# Patient Record
Sex: FEMALE | Race: OTHER | HISPANIC OR LATINO | URBAN - METROPOLITAN AREA
[De-identification: names, ages, dates, MRNs, and addresses within clinical notes are randomized per-mention and may not be internally consistent; named-entity substitution may affect disease eponyms.]

---

## 2023-10-02 ENCOUNTER — TELEPHONE ENCOUNTER (OUTPATIENT)
Dept: URBAN - METROPOLITAN AREA CLINIC 82 | Facility: CLINIC | Age: 48
End: 2023-10-02

## 2023-10-02 ENCOUNTER — OFFICE VISIT (OUTPATIENT)
Dept: URBAN - METROPOLITAN AREA CLINIC 82 | Facility: CLINIC | Age: 48
End: 2023-10-02
Payer: COMMERCIAL

## 2023-10-02 ENCOUNTER — LAB OUTSIDE AN ENCOUNTER (OUTPATIENT)
Dept: URBAN - METROPOLITAN AREA CLINIC 82 | Facility: CLINIC | Age: 48
End: 2023-10-02

## 2023-10-02 VITALS
BODY MASS INDEX: 29.67 KG/M2 | HEART RATE: 81 BPM | DIASTOLIC BLOOD PRESSURE: 87 MMHG | TEMPERATURE: 96.8 F | HEIGHT: 66 IN | WEIGHT: 184.6 LBS | SYSTOLIC BLOOD PRESSURE: 131 MMHG

## 2023-10-02 DIAGNOSIS — R10.33 PERIUMBILICAL PAIN: ICD-10-CM

## 2023-10-02 DIAGNOSIS — K21.9 ACID REFLUX: ICD-10-CM

## 2023-10-02 PROBLEM — 82423001: Status: ACTIVE | Noted: 2023-10-02

## 2023-10-02 PROCEDURE — 99244 OFF/OP CNSLTJ NEW/EST MOD 40: CPT | Performed by: STUDENT IN AN ORGANIZED HEALTH CARE EDUCATION/TRAINING PROGRAM

## 2023-10-02 PROCEDURE — 99204 OFFICE O/P NEW MOD 45 MIN: CPT | Performed by: STUDENT IN AN ORGANIZED HEALTH CARE EDUCATION/TRAINING PROGRAM

## 2023-10-02 RX ORDER — FERROUS SULFATE TAB 325 MG (65 MG ELEMENTAL FE) 325 (65 FE) MG
1 TABLET TAB ORAL
Status: ACTIVE | COMMUNITY

## 2023-10-02 RX ORDER — DICLOFENAC SODIUM TOPICAL GEL, 1% 10 MG/G
AS DIRECTED GEL TOPICAL
Status: ACTIVE | COMMUNITY

## 2023-10-02 RX ORDER — CLINDAMYCIN HYDROCHLORIDE 300 MG/1
1 CAPSULE CAPSULE ORAL
Status: ACTIVE | COMMUNITY

## 2023-10-02 RX ORDER — ATORVASTATIN CALCIUM 80 MG/1
1 TABLET TABLET, FILM COATED ORAL ONCE A DAY
Status: ACTIVE | COMMUNITY

## 2023-10-02 RX ORDER — CETIRIZINE HYDROCHLORIDE 10 MG/1
1 TABLET TABLET, FILM COATED ORAL ONCE A DAY
Status: ACTIVE | COMMUNITY

## 2023-10-02 RX ORDER — NYSTATIN 100000 [USP'U]/G
1 APPLICATION CREAM TOPICAL TWICE A DAY
Status: ACTIVE | COMMUNITY

## 2023-10-02 RX ORDER — CARVEDILOL 12.5 MG/1
1 TABLET WITH FOOD TABLET, FILM COATED ORAL TWICE A DAY
Status: ACTIVE | COMMUNITY

## 2023-10-02 RX ORDER — FEXOFENADINE HYDROCHLORIDE 180 MG/1
1 TABLET SWALLOW WHOLE WITH WATER; DO NOT TAKE WITH FRUIT JUICES TABLET ORAL ONCE A DAY
Status: ACTIVE | COMMUNITY

## 2023-10-02 RX ORDER — KETOCONAZOLE 20 MG/ML
AS DIRECTED SHAMPOO, SUSPENSION TOPICAL
Status: ACTIVE | COMMUNITY

## 2023-10-02 RX ORDER — HYDROXYZINE HYDROCHLORIDE 25 MG/1
1 TABLET AS NEEDED TABLET, FILM COATED ORAL ONCE A DAY
Status: ACTIVE | COMMUNITY

## 2023-10-02 RX ORDER — HYOSCYAMINE SULFATE 0.12 MG/1
1 TABLET AS NEEDED TABLET ORAL
Qty: 180 | Refills: 0 | OUTPATIENT
Start: 2023-10-02 | End: 2023-11-01

## 2023-10-02 RX ORDER — MONTELUKAST 10 MG/1
1 TABLET TABLET, FILM COATED ORAL ONCE A DAY
Status: ACTIVE | COMMUNITY

## 2023-10-02 RX ORDER — PANTOPRAZOLE SODIUM 40 MG/1
1 TABLET TABLET, DELAYED RELEASE ORAL ONCE A DAY
Status: ACTIVE | COMMUNITY

## 2023-10-02 RX ORDER — GABAPENTIN 300 MG/1
1 CAPSULE CAPSULE ORAL
Status: ACTIVE | COMMUNITY

## 2023-10-02 RX ORDER — EPINEPHRINE 0.3 MG/.3ML
AS DIRECTED INJECTION INTRAMUSCULAR; SUBCUTANEOUS
Status: ACTIVE | COMMUNITY

## 2023-10-02 RX ORDER — NAPROXEN 500 MG/1
1 TABLET WITH FOOD OR MILK AS NEEDED TABLET ORAL
Status: ACTIVE | COMMUNITY

## 2023-10-02 RX ORDER — FLUTICASONE PROPIONATE 50 UG/1
1 SPRAY IN EACH NOSTRIL SPRAY, METERED NASAL ONCE A DAY
Status: ACTIVE | COMMUNITY

## 2023-10-02 RX ORDER — HYDROCORTISONE 25 MG/G
1 APPLICATION CREAM TOPICAL ONCE A DAY
Status: ACTIVE | COMMUNITY

## 2023-10-02 RX ORDER — FAMOTIDINE 40 MG/1
1 TABLET AT BEDTIME TABLET, FILM COATED ORAL ONCE A DAY
Status: ACTIVE | COMMUNITY

## 2023-10-02 RX ORDER — NIFEDIPINE 60 MG/1
1 TABLET ON AN EMPTY STOMACH TABLET, EXTENDED RELEASE ORAL ONCE A DAY
Status: ACTIVE | COMMUNITY

## 2023-10-02 RX ORDER — MAGNESIUM OXIDE 400 MG/1
1 TABLET AS NEEDED TABLET ORAL ONCE A DAY
Status: ACTIVE | COMMUNITY

## 2023-10-02 NOTE — HPI-TODAY'S VISIT:
48 y.o femake w PMH of IBS, anxiety, depression, HTN, fibromyalgia, chronic abd pain was referred by Lizet Honeycutt at [facility] for abd pain. A copy of today's note will be sent to the referring clinic.   At today visit, patient admits periumbilical pain, right > left on going for a while, worsening for the past 2 weeks. Admits random onset, denies any provocative factors. Mild relief w BM. She also admits of acid reflux, n/v,recently started. ENT rx patient w pantoprazole and famotidine, mild relief as well. BM: once in the morning, takes magesium daily. No melena or hematochezia. She tries to avoid acidic food and gluten products as well, unsure if it truly helps w pain or not. CT A/P on 09/25/23 grossly unremarkable.   Previously seen by Dr. Saji Adair, colonoscopy this year was normal aside from diverticulosis. She denies any unintentional wt loss, nocturnal sx. No fhx of CRC or gastric cancer. She was previous seen at [facility] in the past for abd pain. EGD/COLONOSCOPY 2013 unremarkable. Had her gallbladder out during that time. Other abd surgeries include abdominoplasty, partial hysterectomy.

## 2023-10-04 ENCOUNTER — CLAIMS CREATED FROM THE CLAIM WINDOW (OUTPATIENT)
Dept: URBAN - METROPOLITAN AREA CLINIC 4 | Facility: CLINIC | Age: 48
End: 2023-10-04
Payer: COMMERCIAL

## 2023-10-04 ENCOUNTER — OFFICE VISIT (OUTPATIENT)
Dept: URBAN - METROPOLITAN AREA SURGERY CENTER 13 | Facility: SURGERY CENTER | Age: 48
End: 2023-10-04
Payer: COMMERCIAL

## 2023-10-04 DIAGNOSIS — K31.9 ERYTHEMA OF GASTRIC ANTRUM: ICD-10-CM

## 2023-10-04 DIAGNOSIS — K31.89 REACTIVE GASTROPATHY: ICD-10-CM

## 2023-10-04 DIAGNOSIS — K22.10 ESOPHAGEAL ULCER: ICD-10-CM

## 2023-10-04 DIAGNOSIS — K21.9 GASTRO-ESOPHAGEAL REFLUX DISEASE WITHOUT ESOPHAGITIS: ICD-10-CM

## 2023-10-04 DIAGNOSIS — R10.33 ABDOMINAL PAIN, ACUTE, PERIUMBILICAL: ICD-10-CM

## 2023-10-04 DIAGNOSIS — K31.89 OTHER DISEASES OF STOMACH AND DUODENUM: ICD-10-CM

## 2023-10-04 DIAGNOSIS — R10.13 INDIGESTION: ICD-10-CM

## 2023-10-04 DIAGNOSIS — K29.70 GASTRITIS, UNSPECIFIED, WITHOUT BLEEDING: ICD-10-CM

## 2023-10-04 PROCEDURE — G8907 PT DOC NO EVENTS ON DISCHARG: HCPCS | Performed by: INTERNAL MEDICINE

## 2023-10-04 PROCEDURE — 43239 EGD BIOPSY SINGLE/MULTIPLE: CPT | Performed by: INTERNAL MEDICINE

## 2023-10-04 PROCEDURE — 88312 SPECIAL STAINS GROUP 1: CPT | Performed by: PATHOLOGY

## 2023-10-04 PROCEDURE — 00731 ANES UPR GI NDSC PX NOS: CPT | Performed by: ANESTHESIOLOGY

## 2023-10-04 PROCEDURE — 88305 TISSUE EXAM BY PATHOLOGIST: CPT | Performed by: PATHOLOGY

## 2023-10-04 PROCEDURE — 00731 ANES UPR GI NDSC PX NOS: CPT | Performed by: NURSE ANESTHETIST, CERTIFIED REGISTERED

## 2023-10-04 RX ORDER — FLUTICASONE PROPIONATE 50 UG/1
1 SPRAY IN EACH NOSTRIL SPRAY, METERED NASAL ONCE A DAY
Status: ACTIVE | COMMUNITY

## 2023-10-04 RX ORDER — PANTOPRAZOLE SODIUM 40 MG/1
1 TABLET TABLET, DELAYED RELEASE ORAL ONCE A DAY
Status: ACTIVE | COMMUNITY

## 2023-10-04 RX ORDER — FAMOTIDINE 40 MG/1
1 TABLET AT BEDTIME TABLET, FILM COATED ORAL ONCE A DAY
Status: ACTIVE | COMMUNITY

## 2023-10-04 RX ORDER — ATORVASTATIN CALCIUM 80 MG/1
1 TABLET TABLET, FILM COATED ORAL ONCE A DAY
Status: ACTIVE | COMMUNITY

## 2023-10-04 RX ORDER — HYDROXYZINE HYDROCHLORIDE 25 MG/1
1 TABLET AS NEEDED TABLET, FILM COATED ORAL ONCE A DAY
Status: ACTIVE | COMMUNITY

## 2023-10-04 RX ORDER — CLINDAMYCIN HYDROCHLORIDE 300 MG/1
1 CAPSULE CAPSULE ORAL
Status: ACTIVE | COMMUNITY

## 2023-10-04 RX ORDER — CETIRIZINE HYDROCHLORIDE 10 MG/1
1 TABLET TABLET, FILM COATED ORAL ONCE A DAY
Status: ACTIVE | COMMUNITY

## 2023-10-04 RX ORDER — HYOSCYAMINE SULFATE 0.12 MG/1
1 TABLET AS NEEDED TABLET ORAL
Qty: 180 | Refills: 0 | Status: ACTIVE | COMMUNITY
Start: 2023-10-02 | End: 2023-11-01

## 2023-10-04 RX ORDER — NYSTATIN 100000 [USP'U]/G
1 APPLICATION CREAM TOPICAL TWICE A DAY
Status: ACTIVE | COMMUNITY

## 2023-10-04 RX ORDER — KETOCONAZOLE 20 MG/ML
AS DIRECTED SHAMPOO, SUSPENSION TOPICAL
Status: ACTIVE | COMMUNITY

## 2023-10-04 RX ORDER — NIFEDIPINE 60 MG/1
1 TABLET ON AN EMPTY STOMACH TABLET, EXTENDED RELEASE ORAL ONCE A DAY
Status: ACTIVE | COMMUNITY

## 2023-10-04 RX ORDER — FEXOFENADINE HYDROCHLORIDE 180 MG/1
1 TABLET SWALLOW WHOLE WITH WATER; DO NOT TAKE WITH FRUIT JUICES TABLET ORAL ONCE A DAY
Status: ACTIVE | COMMUNITY

## 2023-10-04 RX ORDER — FERROUS SULFATE TAB 325 MG (65 MG ELEMENTAL FE) 325 (65 FE) MG
1 TABLET TAB ORAL
Status: ACTIVE | COMMUNITY

## 2023-10-04 RX ORDER — NAPROXEN 500 MG/1
1 TABLET WITH FOOD OR MILK AS NEEDED TABLET ORAL
Status: ACTIVE | COMMUNITY

## 2023-10-04 RX ORDER — MAGNESIUM OXIDE 400 MG/1
1 TABLET AS NEEDED TABLET ORAL ONCE A DAY
Status: ACTIVE | COMMUNITY

## 2023-10-04 RX ORDER — DICLOFENAC SODIUM TOPICAL GEL, 1% 10 MG/G
AS DIRECTED GEL TOPICAL
Status: ACTIVE | COMMUNITY

## 2023-10-04 RX ORDER — CARVEDILOL 12.5 MG/1
1 TABLET WITH FOOD TABLET, FILM COATED ORAL TWICE A DAY
Status: ACTIVE | COMMUNITY

## 2023-10-04 RX ORDER — EPINEPHRINE 0.3 MG/.3ML
AS DIRECTED INJECTION INTRAMUSCULAR; SUBCUTANEOUS
Status: ACTIVE | COMMUNITY

## 2023-10-04 RX ORDER — HYDROCORTISONE 25 MG/G
1 APPLICATION CREAM TOPICAL ONCE A DAY
Status: ACTIVE | COMMUNITY

## 2023-10-04 RX ORDER — GABAPENTIN 300 MG/1
1 CAPSULE CAPSULE ORAL
Status: ACTIVE | COMMUNITY

## 2023-10-04 RX ORDER — MONTELUKAST 10 MG/1
1 TABLET TABLET, FILM COATED ORAL ONCE A DAY
Status: ACTIVE | COMMUNITY

## 2023-11-06 ENCOUNTER — OFFICE VISIT (OUTPATIENT)
Dept: URBAN - METROPOLITAN AREA CLINIC 82 | Facility: CLINIC | Age: 48
End: 2023-11-06
Payer: COMMERCIAL

## 2023-11-06 VITALS
WEIGHT: 185.4 LBS | HEART RATE: 96 BPM | SYSTOLIC BLOOD PRESSURE: 125 MMHG | HEIGHT: 66 IN | TEMPERATURE: 97.5 F | DIASTOLIC BLOOD PRESSURE: 89 MMHG | BODY MASS INDEX: 29.8 KG/M2

## 2023-11-06 DIAGNOSIS — L29.0 ANAL PRURITUS: ICD-10-CM

## 2023-11-06 DIAGNOSIS — R10.33 PERIUMBILICAL PAIN: ICD-10-CM

## 2023-11-06 DIAGNOSIS — K22.10 ULCER OF ESOPHAGUS WITHOUT BLEEDING: ICD-10-CM

## 2023-11-06 DIAGNOSIS — K59.09 CHRONIC CONSTIPATION: ICD-10-CM

## 2023-11-06 PROBLEM — 90446007: Status: ACTIVE | Noted: 2023-11-06

## 2023-11-06 PROBLEM — 162046002: Status: ACTIVE | Noted: 2023-11-06

## 2023-11-06 PROBLEM — 236069009: Status: ACTIVE | Noted: 2023-11-06

## 2023-11-06 PROBLEM — 30811009: Status: ACTIVE | Noted: 2023-11-06

## 2023-11-06 PROCEDURE — 99214 OFFICE O/P EST MOD 30 MIN: CPT | Performed by: STUDENT IN AN ORGANIZED HEALTH CARE EDUCATION/TRAINING PROGRAM

## 2023-11-06 RX ORDER — NYSTATIN 100000 [USP'U]/G
1 APPLICATION CREAM TOPICAL TWICE A DAY
Status: ACTIVE | COMMUNITY

## 2023-11-06 RX ORDER — HYDROXYZINE HYDROCHLORIDE 25 MG/1
1 TABLET AS NEEDED TABLET, FILM COATED ORAL ONCE A DAY
Status: ACTIVE | COMMUNITY

## 2023-11-06 RX ORDER — CARVEDILOL 12.5 MG/1
1 TABLET WITH FOOD TABLET, FILM COATED ORAL TWICE A DAY
Status: ACTIVE | COMMUNITY

## 2023-11-06 RX ORDER — FEXOFENADINE HYDROCHLORIDE 180 MG/1
1 TABLET SWALLOW WHOLE WITH WATER; DO NOT TAKE WITH FRUIT JUICES TABLET ORAL ONCE A DAY
Status: ACTIVE | COMMUNITY

## 2023-11-06 RX ORDER — EPINEPHRINE 0.3 MG/.3ML
AS DIRECTED INJECTION INTRAMUSCULAR; SUBCUTANEOUS
Status: ACTIVE | COMMUNITY

## 2023-11-06 RX ORDER — GABAPENTIN 300 MG/1
1 CAPSULE CAPSULE ORAL
Status: ACTIVE | COMMUNITY

## 2023-11-06 RX ORDER — KETOCONAZOLE 20 MG/ML
AS DIRECTED SHAMPOO, SUSPENSION TOPICAL
Status: ACTIVE | COMMUNITY

## 2023-11-06 RX ORDER — HYOSCYAMINE SULFATE 0.125 MG
1 TABLET ON THE TONGUE AND ALLOW TO DISSOLVE AS NEEDED TABLET,DISINTEGRATING ORAL
Qty: 180 | Refills: 0
End: 2024-02-04

## 2023-11-06 RX ORDER — CLINDAMYCIN HYDROCHLORIDE 300 MG/1
1 CAPSULE CAPSULE ORAL
Status: ACTIVE | COMMUNITY

## 2023-11-06 RX ORDER — SUCRALFATE 1 G/1
1 TABLET ON AN EMPTY STOMACH TABLET ORAL TWICE A DAY
Qty: 60 | OUTPATIENT
Start: 2023-11-06 | End: 2023-12-06

## 2023-11-06 RX ORDER — MAGNESIUM OXIDE 400 MG/1
1 TABLET AS NEEDED TABLET ORAL ONCE A DAY
Status: ACTIVE | COMMUNITY

## 2023-11-06 RX ORDER — HYDROCORTISONE 25 MG/G
1 APPLICATION CREAM TOPICAL ONCE A DAY
Status: ACTIVE | COMMUNITY

## 2023-11-06 RX ORDER — CETIRIZINE HYDROCHLORIDE 10 MG/1
1 TABLET TABLET, FILM COATED ORAL ONCE A DAY
Status: ACTIVE | COMMUNITY

## 2023-11-06 RX ORDER — ATORVASTATIN CALCIUM 80 MG/1
1 TABLET TABLET, FILM COATED ORAL ONCE A DAY
Status: ACTIVE | COMMUNITY

## 2023-11-06 RX ORDER — NITROFURANTOIN (MONOHYDRATE/MACROCRYSTALS) 75; 25 MG/1; MG/1
1 CAPSULE WITH FOOD CAPSULE ORAL
Status: ACTIVE | COMMUNITY

## 2023-11-06 RX ORDER — HYOSCYAMINE SULFATE 0.125 MG
1 TABLET ON THE TONGUE AND ALLOW TO DISSOLVE  AS NEEDED TABLET,DISINTEGRATING ORAL
Status: ACTIVE | COMMUNITY

## 2023-11-06 RX ORDER — NAPROXEN 500 MG/1
1 TABLET WITH FOOD OR MILK AS NEEDED TABLET ORAL
Status: ACTIVE | COMMUNITY

## 2023-11-06 RX ORDER — LINACLOTIDE 145 UG/1
1 CAPSULE AT LEAST 30 MINUTES BEFORE THE FIRST MEAL OF THE DAY ON AN EMPTY STOMACH CAPSULE, GELATIN COATED ORAL ONCE A DAY
Qty: 30 | OUTPATIENT
Start: 2023-11-06 | End: 2023-12-06

## 2023-11-06 RX ORDER — DICYCLOMINE HYDROCHLORIDE 10 MG/1
1 TABLET CAPSULE ORAL
Qty: 90 | Refills: 0 | OUTPATIENT
Start: 2023-11-06 | End: 2023-12-06

## 2023-11-06 RX ORDER — FERROUS SULFATE TAB 325 MG (65 MG ELEMENTAL FE) 325 (65 FE) MG
1 TABLET TAB ORAL
Status: ACTIVE | COMMUNITY

## 2023-11-06 RX ORDER — FAMOTIDINE 40 MG/1
1 TABLET AT BEDTIME TABLET, FILM COATED ORAL ONCE A DAY
Status: ACTIVE | COMMUNITY

## 2023-11-06 RX ORDER — PANTOPRAZOLE SODIUM 40 MG/1
1 TABLET TABLET, DELAYED RELEASE ORAL ONCE A DAY
Status: ACTIVE | COMMUNITY

## 2023-11-06 RX ORDER — NIFEDIPINE 60 MG/1
1 TABLET ON AN EMPTY STOMACH TABLET, EXTENDED RELEASE ORAL ONCE A DAY
Status: ACTIVE | COMMUNITY

## 2023-11-06 RX ORDER — MONTELUKAST 10 MG/1
1 TABLET TABLET, FILM COATED ORAL ONCE A DAY
Status: ACTIVE | COMMUNITY

## 2023-11-06 RX ORDER — DICLOFENAC SODIUM TOPICAL GEL, 1% 10 MG/G
AS DIRECTED GEL TOPICAL
Status: ACTIVE | COMMUNITY

## 2023-11-06 RX ORDER — FLUTICASONE PROPIONATE 50 UG/1
1 SPRAY IN EACH NOSTRIL SPRAY, METERED NASAL ONCE A DAY
Status: ACTIVE | COMMUNITY

## 2023-11-06 NOTE — HPI-TODAY'S VISIT:
48 y.o femake w PMH of IBS, anxiety, depression, HTN, fibromyalgia, chronic abd pain presents today for F/U on EGD.   Recap of last visit: patient admits periumbilical pain, right > left on going for a while, worsening for the past 2 weeks. Admits random onset, denies any provocative factors. Mild relief w BM. She also admits of acid reflux, n/v,recently started. ENT rx patient w pantoprazole and famotidine, mild relief as well. BM: once in the morning, takes magesium daily. No melena or hematochezia. She tries to avoid acidic food and gluten products as well, unsure if it truly helps w pain or not. CT A/P on 09/25/23 grossly unremarkable.   Previously seen by Dr. Saji Adair, multiple colonoscopy in the past: 2015, 2020, 2023, informed to repeat in 5-10yrs. She denies any unintentional wt loss, nocturnal sx. No fhx of CRC or gastric cancer. She was previous seen at [facility] in the past for abd pain. EGD/COLONOSCOPY 2013 unremarkable. Had her gallbladder out during that time. Other abd surgeries include abdominoplasty, partial hysterectomy.   EGD on 10/2023 w Dr. Fulton revealied: Normal hypopharynx. Esophageal ulcer with no bleeding and no stigmata of recent bleeding. Acute gastritis. Bx result grossly unremarkable.  At today visit, patient reports dealing w/ anal pruritus that started 1 week ago that radiates to her her vaginal area. Went to UC, was found to have UTI. Patient states that she is constipated. She took linzess in the past, 145mcg which helped really well. Doesnt have any medications for this anymore. She still on omeprazole 40mg daily which does provides some relief to her heartburns. Hyoscyamine does help w/ abd pain. Overall sx still there, hasnt worsen.

## 2023-12-22 ENCOUNTER — CLAIMS CREATED FROM THE CLAIM WINDOW (OUTPATIENT)
Dept: URBAN - METROPOLITAN AREA CLINIC 82 | Facility: CLINIC | Age: 48
End: 2023-12-22
Payer: COMMERCIAL

## 2023-12-22 VITALS
WEIGHT: 181.4 LBS | HEIGHT: 66 IN | TEMPERATURE: 94.8 F | BODY MASS INDEX: 29.15 KG/M2 | HEART RATE: 85 BPM | SYSTOLIC BLOOD PRESSURE: 144 MMHG | DIASTOLIC BLOOD PRESSURE: 99 MMHG

## 2023-12-22 DIAGNOSIS — K58.1 IRRITABLE BOWEL SYNDROME WITH CONSTIPATION: ICD-10-CM

## 2023-12-22 DIAGNOSIS — K22.10 ULCER OF ESOPHAGUS WITHOUT BLEEDING: ICD-10-CM

## 2023-12-22 DIAGNOSIS — R10.33 PERIUMBILICAL PAIN: ICD-10-CM

## 2023-12-22 DIAGNOSIS — L29.0 ANAL PRURITUS: ICD-10-CM

## 2023-12-22 PROBLEM — 440630006: Status: ACTIVE | Noted: 2023-12-22

## 2023-12-22 PROCEDURE — 99214 OFFICE O/P EST MOD 30 MIN: CPT | Performed by: STUDENT IN AN ORGANIZED HEALTH CARE EDUCATION/TRAINING PROGRAM

## 2023-12-22 RX ORDER — HYOSCYAMINE SULFATE 0.125 MG
1 TABLET ON THE TONGUE AND ALLOW TO DISSOLVE AS NEEDED TABLET,DISINTEGRATING ORAL
Qty: 180 | Refills: 0 | Status: ACTIVE | COMMUNITY
End: 2024-02-04

## 2023-12-22 RX ORDER — KETOCONAZOLE 20 MG/ML
AS DIRECTED SHAMPOO, SUSPENSION TOPICAL
Status: ACTIVE | COMMUNITY

## 2023-12-22 RX ORDER — LINACLOTIDE 290 UG/1
1 CAPSULE AT LEAST 30 MINUTES BEFORE THE FIRST MEAL OF THE DAY ON AN EMPTY STOMACH CAPSULE, GELATIN COATED ORAL ONCE A DAY
Qty: 90 | Refills: 3 | OUTPATIENT
Start: 2023-12-22 | End: 2024-12-16

## 2023-12-22 RX ORDER — HYOSCYAMINE SULFATE 0.125 MG
1 TABLET ON THE TONGUE AND ALLOW TO DISSOLVE AS NEEDED TABLET,DISINTEGRATING ORAL
Qty: 180 | Refills: 0

## 2023-12-22 RX ORDER — ATORVASTATIN CALCIUM 80 MG/1
1 TABLET TABLET, FILM COATED ORAL ONCE A DAY
Status: ACTIVE | COMMUNITY

## 2023-12-22 RX ORDER — CARVEDILOL 12.5 MG/1
1 TABLET WITH FOOD TABLET, FILM COATED ORAL TWICE A DAY
Status: ACTIVE | COMMUNITY

## 2023-12-22 RX ORDER — FLUTICASONE PROPIONATE 50 UG/1
1 SPRAY IN EACH NOSTRIL SPRAY, METERED NASAL ONCE A DAY
Status: ACTIVE | COMMUNITY

## 2023-12-22 RX ORDER — FERROUS SULFATE TAB 325 MG (65 MG ELEMENTAL FE) 325 (65 FE) MG
1 TABLET TAB ORAL
Status: ACTIVE | COMMUNITY

## 2023-12-22 RX ORDER — HYDROCORTISONE 25 MG/G
1 APPLICATION CREAM TOPICAL ONCE A DAY
Status: ACTIVE | COMMUNITY

## 2023-12-22 RX ORDER — EPINEPHRINE 0.3 MG/.3ML
AS DIRECTED INJECTION INTRAMUSCULAR; SUBCUTANEOUS
Status: ACTIVE | COMMUNITY

## 2023-12-22 RX ORDER — NIFEDIPINE 60 MG/1
1 TABLET ON AN EMPTY STOMACH TABLET, EXTENDED RELEASE ORAL ONCE A DAY
Status: ACTIVE | COMMUNITY

## 2023-12-22 RX ORDER — HYDROXYZINE HYDROCHLORIDE 25 MG/1
1 TABLET AS NEEDED TABLET, FILM COATED ORAL ONCE A DAY
Status: ACTIVE | COMMUNITY

## 2023-12-22 RX ORDER — MAGNESIUM OXIDE 400 MG/1
1 TABLET AS NEEDED TABLET ORAL ONCE A DAY
Status: ACTIVE | COMMUNITY

## 2023-12-22 RX ORDER — DICLOFENAC SODIUM TOPICAL GEL, 1% 10 MG/G
AS DIRECTED GEL TOPICAL
Status: ACTIVE | COMMUNITY

## 2023-12-22 RX ORDER — GABAPENTIN 300 MG/1
1 CAPSULE CAPSULE ORAL
Status: ACTIVE | COMMUNITY

## 2023-12-22 RX ORDER — FAMOTIDINE 40 MG/1
1 TABLET AT BEDTIME TABLET, FILM COATED ORAL ONCE A DAY
Status: ACTIVE | COMMUNITY

## 2023-12-22 RX ORDER — NITROFURANTOIN (MONOHYDRATE/MACROCRYSTALS) 75; 25 MG/1; MG/1
1 CAPSULE WITH FOOD CAPSULE ORAL
Status: ACTIVE | COMMUNITY

## 2023-12-22 RX ORDER — PANTOPRAZOLE SODIUM 40 MG/1
1 TABLET TABLET, DELAYED RELEASE ORAL
Qty: 90 | Refills: 2 | OUTPATIENT
Start: 2023-12-22

## 2023-12-22 RX ORDER — NYSTATIN 100000 [USP'U]/G
1 APPLICATION CREAM TOPICAL TWICE A DAY
Status: ACTIVE | COMMUNITY

## 2023-12-22 RX ORDER — CETIRIZINE HYDROCHLORIDE 10 MG/1
1 TABLET TABLET, FILM COATED ORAL ONCE A DAY
Status: ACTIVE | COMMUNITY

## 2023-12-22 RX ORDER — PANTOPRAZOLE SODIUM 40 MG/1
1 TABLET TABLET, DELAYED RELEASE ORAL ONCE A DAY
Status: ACTIVE | COMMUNITY

## 2023-12-22 RX ORDER — FEXOFENADINE HYDROCHLORIDE 180 MG/1
1 TABLET SWALLOW WHOLE WITH WATER; DO NOT TAKE WITH FRUIT JUICES TABLET ORAL ONCE A DAY
Status: ACTIVE | COMMUNITY

## 2023-12-22 RX ORDER — MONTELUKAST 10 MG/1
1 TABLET TABLET, FILM COATED ORAL ONCE A DAY
Status: ACTIVE | COMMUNITY

## 2023-12-22 RX ORDER — CLINDAMYCIN HYDROCHLORIDE 300 MG/1
1 CAPSULE CAPSULE ORAL
Status: ACTIVE | COMMUNITY

## 2023-12-22 RX ORDER — NAPROXEN 500 MG/1
1 TABLET WITH FOOD OR MILK AS NEEDED TABLET ORAL
Status: ACTIVE | COMMUNITY

## 2023-12-22 NOTE — HPI-TODAY'S VISIT:
48 y.o femake w PMH of IBS, anxiety, depression, HTN, fibromyalgia, chronic abd pain presents today for F/U.  Recap of previous visits: patient admits periumbilical pain, right > left on going for a while, worsening for the past 2 weeks. Admits random onset, denies any provocative factors. Mild relief w BM. She also admits of acid reflux, n/v,recently started. ENT rx patient w pantoprazole and famotidine, mild relief as well. BM: once in the morning, takes magesium daily. No melena or hematochezia. She tries to avoid acidic food and gluten products as well, unsure if it truly helps w pain or not. CT A/P on 09/25/23 grossly unremarkable. Previously seen by Dr. Saji Adair, multiple colonoscopy in the past: 2015, 2020, 2023, informed to repeat in 5-10yrs. . She was previous seen at [facility] in the past for abd pain. Had her gallbladder out during that time. Other abd surgeries include abdominoplasty, partial hysterectomy.   EGD on 10/2023 w Dr. Fulton revealied: Normal hypopharynx. Esophageal ulcer with no bleeding and no stigmata of recent bleeding. Acute gastritis. Bx result grossly unremarkable.  At last visit, patient reports dealing w/ anal pruritus that started 1 week ago that radiates to her her vaginal area. Went to UC, was found to have UTI. Patient states that she is constipated. She took linzess in the past, 145mcg which helped really well. Doesnt have any medications for this anymore. She still on omeprazole 40mg daily which does provides some relief to her heartburns. Hyoscyamine does help w/ abd pain. Overall sx still there, hasnt worsen.  At this visit, patient states that she is still having lots of phelgm, heartburns, globus sensation in her throat. Patient took carafate, no relief. She is not on PPI, unsure why. Didnt know she needs it. Still having occasional bloating. Linzess 145mcg doesnt seem to work anymore. Needs to take it miralax w it. No melena or hematochezia. Abd pain overall controlled w/ Hyoscyamine.

## 2024-01-19 ENCOUNTER — TELEPHONE ENCOUNTER (OUTPATIENT)
Dept: URBAN - METROPOLITAN AREA CLINIC 82 | Facility: CLINIC | Age: 49
End: 2024-01-19

## 2024-01-19 RX ORDER — ONDANSETRON 4 MG/1
1 TABLET ON THE TONGUE AND ALLOW TO DISSOLVE TABLET, ORALLY DISINTEGRATING ORAL
Qty: 15 TABLET | Refills: 0 | OUTPATIENT
Start: 2024-01-19

## 2024-01-31 ENCOUNTER — OFFICE VISIT (OUTPATIENT)
Dept: URBAN - METROPOLITAN AREA CLINIC 82 | Facility: CLINIC | Age: 49
End: 2024-01-31
Payer: COMMERCIAL

## 2024-01-31 VITALS
BODY MASS INDEX: 29.8 KG/M2 | DIASTOLIC BLOOD PRESSURE: 79 MMHG | HEART RATE: 99 BPM | WEIGHT: 185.4 LBS | SYSTOLIC BLOOD PRESSURE: 117 MMHG | TEMPERATURE: 98 F | HEIGHT: 66 IN

## 2024-01-31 DIAGNOSIS — K58.1 IRRITABLE BOWEL SYNDROME WITH CONSTIPATION: ICD-10-CM

## 2024-01-31 DIAGNOSIS — K63.8219 SMALL INTESTINAL BACTERIAL OVERGROWTH (SIBO): ICD-10-CM

## 2024-01-31 DIAGNOSIS — K30 FUNCTIONAL DYSPEPSIA: ICD-10-CM

## 2024-01-31 PROBLEM — 446081009: Status: ACTIVE | Noted: 2024-01-31

## 2024-01-31 PROBLEM — 3696007: Status: ACTIVE | Noted: 2024-01-31

## 2024-01-31 PROCEDURE — 99214 OFFICE O/P EST MOD 30 MIN: CPT | Performed by: STUDENT IN AN ORGANIZED HEALTH CARE EDUCATION/TRAINING PROGRAM

## 2024-01-31 RX ORDER — CETIRIZINE HYDROCHLORIDE 10 MG/1
1 TABLET TABLET, FILM COATED ORAL ONCE A DAY
Status: ACTIVE | COMMUNITY

## 2024-01-31 RX ORDER — PANTOPRAZOLE SODIUM 40 MG/1
1 TABLET TABLET, DELAYED RELEASE ORAL
Qty: 90 | Refills: 2 | Status: ACTIVE | COMMUNITY
Start: 2023-12-22

## 2024-01-31 RX ORDER — NIFEDIPINE 60 MG/1
1 TABLET ON AN EMPTY STOMACH TABLET, EXTENDED RELEASE ORAL ONCE A DAY
Status: ACTIVE | COMMUNITY

## 2024-01-31 RX ORDER — CLINDAMYCIN HYDROCHLORIDE 300 MG/1
1 CAPSULE CAPSULE ORAL
Status: DISCONTINUED | COMMUNITY

## 2024-01-31 RX ORDER — ATORVASTATIN CALCIUM 80 MG/1
1 TABLET TABLET, FILM COATED ORAL ONCE A DAY
Status: ACTIVE | COMMUNITY

## 2024-01-31 RX ORDER — FEXOFENADINE HYDROCHLORIDE 180 MG/1
1 TABLET SWALLOW WHOLE WITH WATER; DO NOT TAKE WITH FRUIT JUICES TABLET ORAL ONCE A DAY
Status: ACTIVE | COMMUNITY

## 2024-01-31 RX ORDER — ONDANSETRON 4 MG/1
1 TABLET ON THE TONGUE AND ALLOW TO DISSOLVE TABLET, ORALLY DISINTEGRATING ORAL
Qty: 15 TABLET | Refills: 0 | Status: ACTIVE | COMMUNITY
Start: 2024-01-19

## 2024-01-31 RX ORDER — FAMOTIDINE 40 MG/1
1 TABLET AT BEDTIME TABLET, FILM COATED ORAL ONCE A DAY
Status: ACTIVE | COMMUNITY

## 2024-01-31 RX ORDER — DEXLANSOPRAZOLE 60 MG/1
1 CAPSULE CAPSULE, DELAYED RELEASE ORAL ONCE A DAY
Qty: 30 | OUTPATIENT
Start: 2024-01-31

## 2024-01-31 RX ORDER — FLUTICASONE PROPIONATE 50 UG/1
1 SPRAY IN EACH NOSTRIL SPRAY, METERED NASAL ONCE A DAY
Status: ACTIVE | COMMUNITY

## 2024-01-31 RX ORDER — LINACLOTIDE 290 UG/1
1 CAPSULE AT LEAST 30 MINUTES BEFORE THE FIRST MEAL OF THE DAY ON AN EMPTY STOMACH CAPSULE, GELATIN COATED ORAL ONCE A DAY
Qty: 90 | Refills: 3 | Status: ACTIVE | COMMUNITY
Start: 2023-12-22 | End: 2024-12-16

## 2024-01-31 RX ORDER — NYSTATIN 100000 [USP'U]/G
1 APPLICATION CREAM TOPICAL TWICE A DAY
Status: ACTIVE | COMMUNITY

## 2024-01-31 RX ORDER — KETOCONAZOLE 20 MG/ML
AS DIRECTED SHAMPOO, SUSPENSION TOPICAL
Status: ACTIVE | COMMUNITY

## 2024-01-31 RX ORDER — MONTELUKAST 10 MG/1
1 TABLET TABLET, FILM COATED ORAL ONCE A DAY
Status: ACTIVE | COMMUNITY

## 2024-01-31 RX ORDER — FERROUS SULFATE TAB 325 MG (65 MG ELEMENTAL FE) 325 (65 FE) MG
1 TABLET TAB ORAL
Status: ACTIVE | COMMUNITY

## 2024-01-31 RX ORDER — DICLOFENAC SODIUM TOPICAL GEL, 1% 10 MG/G
AS DIRECTED GEL TOPICAL
Status: DISCONTINUED | COMMUNITY

## 2024-01-31 RX ORDER — NITROFURANTOIN (MONOHYDRATE/MACROCRYSTALS) 75; 25 MG/1; MG/1
1 CAPSULE WITH FOOD CAPSULE ORAL
Status: DISCONTINUED | COMMUNITY

## 2024-01-31 RX ORDER — HYOSCYAMINE SULFATE 0.125 MG
1 TABLET ON THE TONGUE AND ALLOW TO DISSOLVE AS NEEDED TABLET,DISINTEGRATING ORAL
Qty: 180 | Refills: 0 | Status: ACTIVE | COMMUNITY

## 2024-01-31 RX ORDER — PANTOPRAZOLE SODIUM 40 MG/1
1 TABLET TABLET, DELAYED RELEASE ORAL ONCE A DAY
Status: DISCONTINUED | COMMUNITY

## 2024-01-31 RX ORDER — LINACLOTIDE 290 UG/1
1 CAPSULE AT LEAST 30 MINUTES BEFORE THE FIRST MEAL OF THE DAY ON AN EMPTY STOMACH CAPSULE, GELATIN COATED ORAL ONCE A DAY
Qty: 90 | Refills: 4 | OUTPATIENT
Start: 2024-01-31 | End: 2025-04-25

## 2024-01-31 RX ORDER — NAPROXEN 500 MG/1
1 TABLET WITH FOOD OR MILK AS NEEDED TABLET ORAL
Status: DISCONTINUED | COMMUNITY

## 2024-01-31 RX ORDER — EPINEPHRINE 0.3 MG/.3ML
AS DIRECTED INJECTION INTRAMUSCULAR; SUBCUTANEOUS
Status: ACTIVE | COMMUNITY

## 2024-01-31 RX ORDER — GABAPENTIN 300 MG/1
1 CAPSULE CAPSULE ORAL
Status: ACTIVE | COMMUNITY

## 2024-01-31 RX ORDER — HYDROCORTISONE 25 MG/G
1 APPLICATION CREAM TOPICAL ONCE A DAY
Status: ACTIVE | COMMUNITY

## 2024-01-31 RX ORDER — MAGNESIUM OXIDE 400 MG/1
1 TABLET AS NEEDED TABLET ORAL ONCE A DAY
Status: ACTIVE | COMMUNITY

## 2024-01-31 RX ORDER — HYDROXYZINE HYDROCHLORIDE 25 MG/1
1 TABLET AS NEEDED TABLET, FILM COATED ORAL ONCE A DAY
Status: ACTIVE | COMMUNITY

## 2024-01-31 RX ORDER — CARVEDILOL 12.5 MG/1
1 TABLET WITH FOOD TABLET, FILM COATED ORAL TWICE A DAY
Status: ACTIVE | COMMUNITY

## 2024-01-31 NOTE — HPI-TODAY'S VISIT:
48 y.o female w PMH of IBS, anxiety, depression, HTN, fibromyalgia, chronic abd pain presents today for F/U for dyspesia sx.   Recap of last visit on 12/2023: patient states that she is still having lots of phelgm, heartburns, globus sensation in her throat. Patient took carafate, no relief. She is not on PPI, unsure why. Didnt know she needs it. Still having occasional bloating. Linzess 145mcg doesnt seem to work anymore. Needs to take it miralax w it. No melena or hematochezia. Abd pain overall controlled w/ Hyoscyamine.  Other hx: - Psx: abdominoplasty, partial hysterectomy.  - Previously seen by Dr. Saji Adair, multiple colonoscopy in the past: 2015, 2020, 2023, informed to repeat in 5-10yrs.  - CT A/P on 09/25/23 grossly unremarkable - EGD on 10/2023 w Dr. Fulton revealied: Normal hypopharynx. Esophageal ulcer with no bleeding and no stigmata of recent bleeding. Acute gastritis. Bx result grossly unremarkable.  At this visit, patient states that she has been more bloated, burping, indigestion and burning sensation in her throat. She is taking PPI 40mg BID, somewhat helped but not much. She states that when she saw Dr. Adair in the past, was given xifaxin to take which provide greater relief. She took zofran, minimal relief for nausea. She is taking linzess 290mcg, able to have daily BM. No wt loss or nocturnal sx. Appetite well.

## 2024-03-04 ENCOUNTER — OV EP (OUTPATIENT)
Dept: URBAN - METROPOLITAN AREA CLINIC 82 | Facility: CLINIC | Age: 49
End: 2024-03-04
Payer: COMMERCIAL

## 2024-03-04 VITALS
TEMPERATURE: 97.3 F | SYSTOLIC BLOOD PRESSURE: 121 MMHG | WEIGHT: 183.8 LBS | DIASTOLIC BLOOD PRESSURE: 82 MMHG | BODY MASS INDEX: 29.54 KG/M2 | HEIGHT: 66 IN | HEART RATE: 97 BPM

## 2024-03-04 DIAGNOSIS — K58.1 IRRITABLE BOWEL SYNDROME WITH CONSTIPATION: ICD-10-CM

## 2024-03-04 DIAGNOSIS — K57.30 DIVERTICULOSIS OF COLON WITHOUT DIVERTICULITIS: ICD-10-CM

## 2024-03-04 DIAGNOSIS — K30 FUNCTIONAL DYSPEPSIA: ICD-10-CM

## 2024-03-04 DIAGNOSIS — K21.9 ACID REFLUX: ICD-10-CM

## 2024-03-04 PROBLEM — 398311004: Status: ACTIVE | Noted: 2024-03-04

## 2024-03-04 PROCEDURE — 99214 OFFICE O/P EST MOD 30 MIN: CPT | Performed by: INTERNAL MEDICINE

## 2024-03-04 RX ORDER — FAMOTIDINE 40 MG/1
1 TABLET AT BEDTIME TABLET, FILM COATED ORAL ONCE A DAY
Status: ON HOLD | COMMUNITY

## 2024-03-04 RX ORDER — LINACLOTIDE 290 UG/1
1 CAPSULE AT LEAST 30 MINUTES BEFORE THE FIRST MEAL OF THE DAY ON AN EMPTY STOMACH CAPSULE, GELATIN COATED ORAL ONCE A DAY
Qty: 90 | Refills: 4 | Status: ACTIVE | COMMUNITY
Start: 2024-01-31 | End: 2025-04-25

## 2024-03-04 RX ORDER — ATORVASTATIN CALCIUM 80 MG/1
1 TABLET TABLET, FILM COATED ORAL ONCE A DAY
Status: ACTIVE | COMMUNITY

## 2024-03-04 RX ORDER — ONDANSETRON 4 MG/1
1 TABLET ON THE TONGUE AND ALLOW TO DISSOLVE TABLET, ORALLY DISINTEGRATING ORAL
Qty: 15 TABLET | Refills: 0 | Status: ON HOLD | COMMUNITY
Start: 2024-01-19

## 2024-03-04 RX ORDER — MONTELUKAST 10 MG/1
1 TABLET TABLET, FILM COATED ORAL ONCE A DAY
Status: ON HOLD | COMMUNITY

## 2024-03-04 RX ORDER — HYOSCYAMINE SULFATE 0.125 MG
1 TABLET ON THE TONGUE AND ALLOW TO DISSOLVE AS NEEDED TABLET,DISINTEGRATING ORAL
Qty: 180 | Refills: 0 | Status: ACTIVE | COMMUNITY

## 2024-03-04 RX ORDER — EPINEPHRINE 0.3 MG/.3ML
AS DIRECTED INJECTION INTRAMUSCULAR; SUBCUTANEOUS
Status: ON HOLD | COMMUNITY

## 2024-03-04 RX ORDER — PANTOPRAZOLE SODIUM 40 MG/1
1 TABLET TABLET, DELAYED RELEASE ORAL
Qty: 90 | Refills: 2 | Status: ON HOLD | COMMUNITY
Start: 2023-12-22

## 2024-03-04 RX ORDER — FLUTICASONE PROPIONATE 50 UG/1
1 SPRAY IN EACH NOSTRIL SPRAY, METERED NASAL ONCE A DAY
Status: ON HOLD | COMMUNITY

## 2024-03-04 RX ORDER — FEXOFENADINE HYDROCHLORIDE 180 MG/1
1 TABLET SWALLOW WHOLE WITH WATER; DO NOT TAKE WITH FRUIT JUICES TABLET ORAL ONCE A DAY
Status: ON HOLD | COMMUNITY

## 2024-03-04 RX ORDER — KETOCONAZOLE 20 MG/ML
AS DIRECTED SHAMPOO, SUSPENSION TOPICAL
Status: ON HOLD | COMMUNITY

## 2024-03-04 RX ORDER — RIFAXIMIN 550 MG/1
1 TABLET TABLET ORAL THREE TIMES A DAY
Qty: 42 TABLET | Refills: 0 | Status: ACTIVE | COMMUNITY
Start: 2024-02-27 | End: 2024-03-12

## 2024-03-04 RX ORDER — NYSTATIN 100000 [USP'U]/G
1 APPLICATION CREAM TOPICAL TWICE A DAY
Status: ON HOLD | COMMUNITY

## 2024-03-04 RX ORDER — HYDROCORTISONE 25 MG/G
1 APPLICATION CREAM TOPICAL ONCE A DAY
Status: ON HOLD | COMMUNITY

## 2024-03-04 RX ORDER — HYDROXYZINE HYDROCHLORIDE 25 MG/1
1 TABLET AS NEEDED TABLET, FILM COATED ORAL ONCE A DAY
Status: ON HOLD | COMMUNITY

## 2024-03-04 RX ORDER — GABAPENTIN 300 MG/1
1 CAPSULE CAPSULE ORAL
Status: ACTIVE | COMMUNITY

## 2024-03-04 RX ORDER — DEXLANSOPRAZOLE 60 MG/1
1 CAPSULE CAPSULE, DELAYED RELEASE ORAL ONCE A DAY
Qty: 30 | Status: ON HOLD | COMMUNITY
Start: 2024-01-31

## 2024-03-04 RX ORDER — CETIRIZINE HYDROCHLORIDE 10 MG/1
1 TABLET TABLET, FILM COATED ORAL ONCE A DAY
Status: ON HOLD | COMMUNITY

## 2024-03-04 RX ORDER — CARVEDILOL 12.5 MG/1
1 TABLET WITH FOOD TABLET, FILM COATED ORAL TWICE A DAY
Status: ACTIVE | COMMUNITY

## 2024-03-04 RX ORDER — MAGNESIUM OXIDE 400 MG/1
1 TABLET AS NEEDED TABLET ORAL ONCE A DAY
Status: ON HOLD | COMMUNITY

## 2024-03-04 RX ORDER — NIFEDIPINE 60 MG/1
1 TABLET ON AN EMPTY STOMACH TABLET, EXTENDED RELEASE ORAL ONCE A DAY
Status: ACTIVE | COMMUNITY

## 2024-03-04 RX ORDER — FERROUS SULFATE TAB 325 MG (65 MG ELEMENTAL FE) 325 (65 FE) MG
1 TABLET TAB ORAL
Status: ON HOLD | COMMUNITY

## 2024-03-04 NOTE — HPI-TODAY'S VISIT:
lacose intolerance test was positive, SIBO test was + and Rifaxan was prescribe Multiple colonoscopies last on 3/2023,no signs o IBD,sigmoid diverticulosis EGD 10/2023 small clean base GEJ ulcer, chemical gastropathy ,normal duodenum She is on Dexilant and Linzess for IBS-C Increas 2 pounds since last 12/2023 CT 10/2023 with no solid ihsan pathology that merits attentio with more diagnostic test

## 2024-03-13 ENCOUNTER — OV EP (OUTPATIENT)
Dept: URBAN - METROPOLITAN AREA CLINIC 82 | Facility: CLINIC | Age: 49
End: 2024-03-13

## 2024-05-13 ENCOUNTER — DASHBOARD ENCOUNTERS (OUTPATIENT)
Age: 49
End: 2024-05-13

## 2024-05-13 ENCOUNTER — OFFICE VISIT (OUTPATIENT)
Dept: URBAN - METROPOLITAN AREA CLINIC 82 | Facility: CLINIC | Age: 49
End: 2024-05-13
Payer: COMMERCIAL

## 2024-05-13 VITALS
HEIGHT: 66 IN | SYSTOLIC BLOOD PRESSURE: 134 MMHG | HEART RATE: 99 BPM | BODY MASS INDEX: 31.69 KG/M2 | WEIGHT: 197.2 LBS | DIASTOLIC BLOOD PRESSURE: 89 MMHG | TEMPERATURE: 97.3 F

## 2024-05-13 DIAGNOSIS — K63.8219 SMALL INTESTINAL BACTERIAL OVERGROWTH (SIBO): ICD-10-CM

## 2024-05-13 DIAGNOSIS — K62.89 ANAL IRRITATION: ICD-10-CM

## 2024-05-13 DIAGNOSIS — K58.1 IRRITABLE BOWEL SYNDROME WITH CONSTIPATION: ICD-10-CM

## 2024-05-13 DIAGNOSIS — K30 FUNCTIONAL DYSPEPSIA: ICD-10-CM

## 2024-05-13 PROCEDURE — 99214 OFFICE O/P EST MOD 30 MIN: CPT | Performed by: STUDENT IN AN ORGANIZED HEALTH CARE EDUCATION/TRAINING PROGRAM

## 2024-05-13 RX ORDER — EPINEPHRINE 0.3 MG/.3ML
AS DIRECTED INJECTION INTRAMUSCULAR; SUBCUTANEOUS
Status: DISCONTINUED | COMMUNITY

## 2024-05-13 RX ORDER — NYSTATIN 100000 [USP'U]/G
1 APPLICATION CREAM TOPICAL TWICE A DAY
Status: DISCONTINUED | COMMUNITY

## 2024-05-13 RX ORDER — FAMOTIDINE 40 MG/1
1 TABLET AT BEDTIME TABLET, FILM COATED ORAL ONCE A DAY
Status: DISCONTINUED | COMMUNITY

## 2024-05-13 RX ORDER — HYOSCYAMINE SULFATE 0.125 MG
1 TABLET ON THE TONGUE AND ALLOW TO DISSOLVE AS NEEDED TABLET,DISINTEGRATING ORAL
Qty: 180 | Refills: 0 | Status: ACTIVE | COMMUNITY

## 2024-05-13 RX ORDER — HYDROXYZINE HYDROCHLORIDE 25 MG/1
1 TABLET AS NEEDED TABLET, FILM COATED ORAL ONCE A DAY
Status: DISCONTINUED | COMMUNITY

## 2024-05-13 RX ORDER — FLUTICASONE PROPIONATE 50 UG/1
1 SPRAY IN EACH NOSTRIL SPRAY, METERED NASAL ONCE A DAY
Status: DISCONTINUED | COMMUNITY

## 2024-05-13 RX ORDER — HYDROCORTISONE ACETATE 25 MG/1
1 SUPPOSITORY SUPPOSITORY RECTAL TWICE A DAY
Qty: 12 | Refills: 1 | OUTPATIENT
Start: 2024-05-13 | End: 2024-05-25

## 2024-05-13 RX ORDER — MAGNESIUM OXIDE 400 MG/1
1 TABLET AS NEEDED TABLET ORAL ONCE A DAY
Status: DISCONTINUED | COMMUNITY

## 2024-05-13 RX ORDER — NIFEDIPINE 60 MG/1
1 TABLET ON AN EMPTY STOMACH TABLET, EXTENDED RELEASE ORAL ONCE A DAY
Status: ACTIVE | COMMUNITY

## 2024-05-13 RX ORDER — FEXOFENADINE HYDROCHLORIDE 180 MG/1
1 TABLET SWALLOW WHOLE WITH WATER; DO NOT TAKE WITH FRUIT JUICES TABLET ORAL ONCE A DAY
Status: DISCONTINUED | COMMUNITY

## 2024-05-13 RX ORDER — GABAPENTIN 300 MG/1
1 CAPSULE CAPSULE ORAL
Status: ACTIVE | COMMUNITY

## 2024-05-13 RX ORDER — MONTELUKAST 10 MG/1
1 TABLET TABLET, FILM COATED ORAL ONCE A DAY
Status: DISCONTINUED | COMMUNITY

## 2024-05-13 RX ORDER — ATORVASTATIN CALCIUM 80 MG/1
1 TABLET TABLET, FILM COATED ORAL ONCE A DAY
Status: ACTIVE | COMMUNITY

## 2024-05-13 RX ORDER — DEXLANSOPRAZOLE 60 MG/1
1 CAPSULE CAPSULE, DELAYED RELEASE ORAL ONCE A DAY
Qty: 30 | Status: DISCONTINUED | COMMUNITY
Start: 2024-01-31

## 2024-05-13 RX ORDER — CARVEDILOL 12.5 MG/1
1 TABLET WITH FOOD TABLET, FILM COATED ORAL TWICE A DAY
Status: ACTIVE | COMMUNITY

## 2024-05-13 RX ORDER — KETOCONAZOLE 20 MG/ML
AS DIRECTED SHAMPOO, SUSPENSION TOPICAL
Status: DISCONTINUED | COMMUNITY

## 2024-05-13 RX ORDER — DEXLANSOPRAZOLE 60 MG/1
1 CAPSULE CAPSULE, DELAYED RELEASE ORAL ONCE A DAY
Qty: 30 | OUTPATIENT

## 2024-05-13 RX ORDER — CETIRIZINE HYDROCHLORIDE 10 MG/1
1 TABLET TABLET, FILM COATED ORAL ONCE A DAY
Status: DISCONTINUED | COMMUNITY

## 2024-05-13 RX ORDER — LINACLOTIDE 290 UG/1
1 CAPSULE AT LEAST 30 MINUTES BEFORE THE FIRST MEAL OF THE DAY ON AN EMPTY STOMACH CAPSULE, GELATIN COATED ORAL ONCE A DAY
Qty: 90 | Refills: 4 | Status: DISCONTINUED | COMMUNITY
Start: 2024-01-31 | End: 2025-04-25

## 2024-05-13 RX ORDER — PANTOPRAZOLE SODIUM 40 MG/1
1 TABLET TABLET, DELAYED RELEASE ORAL
Qty: 90 | Refills: 2 | Status: DISCONTINUED | COMMUNITY
Start: 2023-12-22

## 2024-05-13 RX ORDER — HYDROCORTISONE 25 MG/G
1 APPLICATION CREAM TOPICAL ONCE A DAY
Status: DISCONTINUED | COMMUNITY

## 2024-05-13 RX ORDER — ONDANSETRON 4 MG/1
1 TABLET ON THE TONGUE AND ALLOW TO DISSOLVE TABLET, ORALLY DISINTEGRATING ORAL
Qty: 15 TABLET | Refills: 0 | Status: DISCONTINUED | COMMUNITY
Start: 2024-01-19

## 2024-05-13 RX ORDER — FERROUS SULFATE TAB 325 MG (65 MG ELEMENTAL FE) 325 (65 FE) MG
1 TABLET TAB ORAL
Status: DISCONTINUED | COMMUNITY

## 2024-07-10 ENCOUNTER — OFFICE VISIT (OUTPATIENT)
Dept: URBAN - METROPOLITAN AREA SURGERY CENTER 15 | Facility: SURGERY CENTER | Age: 49
End: 2024-07-10

## 2024-08-06 ENCOUNTER — OFFICE VISIT (OUTPATIENT)
Dept: URBAN - METROPOLITAN AREA CLINIC 78 | Facility: CLINIC | Age: 49
End: 2024-08-06
Payer: COMMERCIAL

## 2024-08-06 VITALS
BODY MASS INDEX: 29.89 KG/M2 | HEIGHT: 66 IN | TEMPERATURE: 98 F | HEART RATE: 92 BPM | SYSTOLIC BLOOD PRESSURE: 119 MMHG | DIASTOLIC BLOOD PRESSURE: 80 MMHG | WEIGHT: 186 LBS

## 2024-08-06 DIAGNOSIS — K22.10 ULCER OF ESOPHAGUS WITHOUT BLEEDING: ICD-10-CM

## 2024-08-06 DIAGNOSIS — K30 FUNCTIONAL DYSPEPSIA: ICD-10-CM

## 2024-08-06 DIAGNOSIS — K63.8219 SMALL INTESTINAL BACTERIAL OVERGROWTH (SIBO): ICD-10-CM

## 2024-08-06 DIAGNOSIS — K58.1 IRRITABLE BOWEL SYNDROME WITH CONSTIPATION: ICD-10-CM

## 2024-08-06 PROBLEM — 397881000: Status: ACTIVE | Noted: 2024-08-06

## 2024-08-06 PROCEDURE — 99215 OFFICE O/P EST HI 40 MIN: CPT | Performed by: INTERNAL MEDICINE

## 2024-08-06 RX ORDER — VONOPRAZAN FUMARATE 26.72 MG/1
1 TABLET TABLET ORAL ONCE A DAY
Qty: 56 TABLET | Refills: 0 | OUTPATIENT
Start: 2024-08-06 | End: 2024-10-01

## 2024-08-06 RX ORDER — NEOMYCIN SULFATE 500 MG/1
1 TABLET TABLET ORAL
Qty: 28 TABLET | Refills: 0 | OUTPATIENT
Start: 2024-08-06

## 2024-08-06 RX ORDER — ONDANSETRON 4 MG/1
1 TABLET ON THE TONGUE AND ALLOW TO DISSOLVE TABLET, ORALLY DISINTEGRATING ORAL
Qty: 30 TABLET | Refills: 0 | OUTPATIENT
Start: 2024-08-06

## 2024-08-06 RX ORDER — HYOSCYAMINE SULFATE 0.12 MG/1
1 TABLET UNDER THE TONGUE AND ALLOW TO DISSOLVE TABLET, ORALLY DISINTEGRATING ORAL
Qty: 40 | Refills: 2 | OUTPATIENT
Start: 2024-08-06 | End: 2025-05-03

## 2024-08-06 RX ORDER — LINACLOTIDE 145 UG/1
1 CAPSULE AT LEAST 30 MINUTES BEFORE THE FIRST MEAL OF THE DAY ON AN EMPTY STOMACH CAPSULE, GELATIN COATED ORAL ONCE A DAY
Qty: 90 | Refills: 3 | OUTPATIENT
Start: 2024-08-06 | End: 2025-08-01

## 2024-08-06 RX ORDER — GABAPENTIN 300 MG/1
1 CAPSULE CAPSULE ORAL
Status: ACTIVE | COMMUNITY

## 2024-08-06 RX ORDER — NIFEDIPINE 60 MG/1
1 TABLET ON AN EMPTY STOMACH TABLET, EXTENDED RELEASE ORAL ONCE A DAY
Status: ACTIVE | COMMUNITY

## 2024-08-06 RX ORDER — HYOSCYAMINE SULFATE 0.125 MG
1 TABLET ON THE TONGUE AND ALLOW TO DISSOLVE AS NEEDED TABLET,DISINTEGRATING ORAL
Qty: 180 | Refills: 0 | Status: ACTIVE | COMMUNITY

## 2024-08-06 RX ORDER — RIFAXIMIN 550 MG/1
1 TABLET TABLET ORAL THREE TIMES A DAY
Qty: 42 TABLET | Refills: 0 | OUTPATIENT
Start: 2024-08-06 | End: 2024-08-20

## 2024-08-06 RX ORDER — ATORVASTATIN CALCIUM 80 MG/1
1 TABLET TABLET, FILM COATED ORAL ONCE A DAY
Status: ACTIVE | COMMUNITY

## 2024-08-06 RX ORDER — CARVEDILOL 12.5 MG/1
1 TABLET WITH FOOD TABLET, FILM COATED ORAL TWICE A DAY
Status: ACTIVE | COMMUNITY

## 2024-08-06 RX ORDER — DEXLANSOPRAZOLE 60 MG/1
1 CAPSULE CAPSULE, DELAYED RELEASE ORAL ONCE A DAY
Qty: 30 | Status: ACTIVE | COMMUNITY

## 2024-08-06 NOTE — HPI-TODAY'S VISIT:
The patient was referred to us by Dr. Gurvinder Li is here for a fifth opinion. A copy of this note will be sent to the referring physician. Previously seen by Milli Radha ALAS/ Dr. Zoran Fulton/Dr. Chio Bullock/ Dr. Santos/ Dr. Saji Adair for chronic abd pain and dyspesia sx.  She tells me today that she has had chronic abdominal pain. She describes the pain as a burning/stabbing sensation. She has tried antispasmodics with partial response.   She describes constipation. She takes Linzess 290mcg which has helped. She is not taking it every day. She took one last night and was able to evacuate adequately.   She has heratburn. She describes retrosternal burnign , especially at night. She is taking Famotidine 40mg QHS with little relief. She has tried multiple PPIs (Omeprazole, Nexium, Pantoprazole) She did get some Voquezna samples from Dr. Fernando Li which she felt helped, however her insurance refused to pay for it. Of note she had an esoph ulcer noted on EGD in 2023.   She has also noticed severe anal itching. She has seen Dr. Santos. He perfromed hemorrhoid banding on her.   Previously she had also been seen by Dr. Saji Adair; she has had multiple colonoscopies in the past: 2015, 2020, 2023, informed to repeat in 5-10yrs.The patient has never had a colonoscopy previously. There is no FH of colon cancer or colon polyps.    There is no recent history of rectal bleeding. The patient has no pertinent additional complaints of abdominal pain, constipation, diarrhea, bloating, rectal pain, anorexia or unintentional weight loss.        Regarding any upper GI complaints, the patient has not had heartburn, nausea, vomiting or dysphagia.        The patient does not take blood thinners.       They deny any CP or WYNN.  She has a history of abdominoplasty, partial hysterectomy, lap eloina.  Summary of prior workup: - SIBO test on 2/4/24 labs on 10/18/2013: Negative celiac panel consistent with malabsorption with excessive hydrogen production but not so much methane production. Treated with Xifaxan.  - Previously seen by Dr. Saji Adair, multiple colonoscopy in the past: 2015, 2020, 2023, informed to repeat in 5-10yrs. - CT A/P on 09/25/23 grossly unremarkable - EGD on 10/2023 w Dr. Fulton revealed: Normal hypopharynx. Esophageal ulcer with no bleeding and no stigmata of recent bleeding at the GEJ. Antral and body erythema. Normal duodenum. 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 Colonoscopy by Dr. Chio Bullock and 5/16/2013 only showed hypertrophied anal papilla.  - Labs in 2013: Neg celiac panel. CRP 3.2. - EGD on 4/30/2013: Normal esophagus, stomach and duodenum.  Biopsies were negative for H. pylori. No intestinal metaplasia.

## 2024-10-10 ENCOUNTER — OFFICE VISIT (OUTPATIENT)
Dept: URBAN - METROPOLITAN AREA CLINIC 78 | Facility: CLINIC | Age: 49
End: 2024-10-10
Payer: COMMERCIAL

## 2024-10-10 VITALS — HEIGHT: 66 IN

## 2024-10-10 DIAGNOSIS — F32.9 DEPRESSION, UNSPECIFIED DEPRESSION TYPE: ICD-10-CM

## 2024-10-10 DIAGNOSIS — E66.811 OBESITY, CLASS I, BMI 30-34.9: ICD-10-CM

## 2024-10-10 DIAGNOSIS — K63.8219 SMALL INTESTINAL BACTERIAL OVERGROWTH (SIBO): ICD-10-CM

## 2024-10-10 DIAGNOSIS — R11.0 CHRONIC NAUSEA: ICD-10-CM

## 2024-10-10 DIAGNOSIS — K58.1 IRRITABLE BOWEL SYNDROME WITH CONSTIPATION: ICD-10-CM

## 2024-10-10 DIAGNOSIS — K62.89 ANAL IRRITATION: ICD-10-CM

## 2024-10-10 DIAGNOSIS — K22.10 ULCER OF ESOPHAGUS WITHOUT BLEEDING: ICD-10-CM

## 2024-10-10 DIAGNOSIS — L29.0 ANAL ITCHING: ICD-10-CM

## 2024-10-10 DIAGNOSIS — K59.01 CONSTIPATION: ICD-10-CM

## 2024-10-10 DIAGNOSIS — R12 HEARTBURN: ICD-10-CM

## 2024-10-10 DIAGNOSIS — K90.41 NCGS (NON-CELIAC GLUTEN SENSITIVITY): ICD-10-CM

## 2024-10-10 DIAGNOSIS — F41.9 ANXIETY: ICD-10-CM

## 2024-10-10 DIAGNOSIS — K64.8 INTERNAL HEMORRHOIDS: ICD-10-CM

## 2024-10-10 DIAGNOSIS — T78.2XXD ANAPHYLAXIS, SUBSEQUENT ENCOUNTER: ICD-10-CM

## 2024-10-10 DIAGNOSIS — K57.90 DIVERTICULOSIS: ICD-10-CM

## 2024-10-10 DIAGNOSIS — K30 FUNCTIONAL DYSPEPSIA: ICD-10-CM

## 2024-10-10 PROCEDURE — 99214 OFFICE O/P EST MOD 30 MIN: CPT | Performed by: INTERNAL MEDICINE

## 2024-10-10 RX ORDER — DEXLANSOPRAZOLE 60 MG/1
1 CAPSULE CAPSULE, DELAYED RELEASE ORAL ONCE A DAY
Qty: 30 | Status: ACTIVE | COMMUNITY

## 2024-10-10 RX ORDER — LINACLOTIDE 145 UG/1
1 CAPSULE AT LEAST 30 MINUTES BEFORE THE FIRST MEAL OF THE DAY ON AN EMPTY STOMACH CAPSULE, GELATIN COATED ORAL ONCE A DAY
Qty: 90 | Refills: 3 | Status: ACTIVE | COMMUNITY
Start: 2024-08-06 | End: 2025-08-01

## 2024-10-10 RX ORDER — OMEPRAZOLE AND SODIUM BICARBONATE 40; 1100 MG/1; MG/1
1 CAPSULE ON AN EMPTY STOMACH CAPSULE ORAL ONCE A DAY
Qty: 30 | OUTPATIENT
Start: 2024-10-10

## 2024-10-10 RX ORDER — GABAPENTIN 300 MG/1
1 CAPSULE CAPSULE ORAL
Status: ACTIVE | COMMUNITY

## 2024-10-10 RX ORDER — ATORVASTATIN CALCIUM 80 MG/1
1 TABLET TABLET, FILM COATED ORAL ONCE A DAY
Status: ACTIVE | COMMUNITY

## 2024-10-10 RX ORDER — HYOSCYAMINE SULFATE 0.12 MG/1
1 TABLET UNDER THE TONGUE AND ALLOW TO DISSOLVE TABLET, ORALLY DISINTEGRATING ORAL
Qty: 40 | Refills: 2 | Status: ACTIVE | COMMUNITY
Start: 2024-08-06 | End: 2025-05-03

## 2024-10-10 RX ORDER — LINACLOTIDE 290 UG/1
1 CAPSULE AT LEAST 30 MINUTES BEFORE THE FIRST MEAL OF THE DAY ON AN EMPTY STOMACH CAPSULE, GELATIN COATED ORAL ONCE A DAY
Qty: 30 | OUTPATIENT
Start: 2024-10-10 | End: 2024-11-08

## 2024-10-10 RX ORDER — NIFEDIPINE 60 MG/1
1 TABLET ON AN EMPTY STOMACH TABLET, EXTENDED RELEASE ORAL ONCE A DAY
Status: ACTIVE | COMMUNITY

## 2024-10-10 RX ORDER — NEOMYCIN SULFATE 500 MG/1
1 TABLET TABLET ORAL
Qty: 28 TABLET | Refills: 0 | Status: ACTIVE | COMMUNITY
Start: 2024-08-06

## 2024-10-10 RX ORDER — CARVEDILOL 12.5 MG/1
1 TABLET WITH FOOD TABLET, FILM COATED ORAL TWICE A DAY
Status: ACTIVE | COMMUNITY

## 2024-10-10 RX ORDER — HYOSCYAMINE SULFATE 0.125 MG
1 TABLET ON THE TONGUE AND ALLOW TO DISSOLVE AS NEEDED TABLET,DISINTEGRATING ORAL
Qty: 180 | Refills: 0 | Status: ACTIVE | COMMUNITY

## 2024-10-10 RX ORDER — ONDANSETRON 4 MG/1
1 TABLET ON THE TONGUE AND ALLOW TO DISSOLVE TABLET, ORALLY DISINTEGRATING ORAL
Qty: 30 TABLET | Refills: 0 | Status: ACTIVE | COMMUNITY
Start: 2024-08-06

## 2024-10-10 NOTE — HPI-TODAY'S VISIT:
The patient was referred to us by Dr. Gurvinder Li and initially came to see me for a fifth opinion. A copy of this note will be sent to the referring physician. Previously seen by [facility] Radha ALAS/ Dr. Zoran Fulton/Dr. Chio Bullock/ Dr. Santos/ Dr. Saji Adair for chronic abd pain and dyspesia sx.  She has had chronic abdominal pain. She describes the pain as a burning/stabbing sensation. She has tried antispasmodics with partial response.   She has heartburn. She describes retrosternal burning , especially at night. She is taking Famotidine 40mg QHS with little relief. She has tried multiple PPIs (Omeprazole, Nexium, Pantoprazole) She did get some Voquezna samples from Dr. Fernando Li which she felt helped, however her insurance refused to pay for it. Of note she had an esoph ulcer noted on EGD in 2023.   Her insurance continues to refuse to pay for Voquezna.   She states that about 2 weeks ago she developed sever pain under both her axillae and retrosternal cchest pain accompanied by fevers. The pain was so severe she had to be admitted for pneumonia. She was treated with antibiotics. She developed a allergic reaction (diffuse abodminal erythematous/pruritic rash). She will be following with [facility] allergist in a coudl oe weeks. She is now on Steroids and Benadryl.  She will be seeing the Pulmonologist tomrorow.   She does not feel Linzess 145mcg worked for her. On the other hand, IBSrela BID dosing was too much for her. She feels that the Xifaxan helped her quite a bit. She is not having as much flatulence.   She has also noticed severe anal itching. She has seen Dr. Santos. He performed hemorrhoid banding on her.   Previously she had also been seen by Dr. Saji Adair; she has had multiple colonoscopies in the past: 2015, 2020, 2023, informed to repeat in 5-10yrs.The patient has never had a colonoscopy previously. There is no FH of colon cancer or colon polyps.    There is no recent history of rectal bleeding. The patient has no pertinent additional complaints of abdominal pain, constipation, diarrhea, bloating, rectal pain, anorexia or unintentional weight loss.       Regarding any upper GI complaints, the patient has not had heartburn, nausea, vomiting or dysphagia.       The patient does not take blood thinners.       They deny any CP or WYNN.  She has a history of abdominoplasty, partial hysterectomy, lap eloina.  Summary of prior workup: - SIBO test on 2/4/24 labs on 10/18/2013: Negative celiac panel consistent with malabsorption with excessive hydrogen production but not so much methane production. Treated with Xifaxan.  - Previously seen by Dr. Saji Adair, multiple colonoscopy in the past: 2015, 2020, 2023, informed to repeat in 5-10yrs. - CT A/P on 09/25/23 grossly unremarkable - EGD on 10/2023 w Dr. Fulton revealed: Normal hypopharynx. Esophageal ulcer with no bleeding and no stigmata of recent bleeding at the GEJ. Antral and body erythema. Normal duodenum. 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 Colonoscopy by Dr. Chio Bullock and 5/16/2013 only showed hypertrophied anal papilla.  - Labs in 2013: Neg celiac panel. CRP 3.2. - EGD on 4/30/2013: Normal esophagus, stomach and duodenum.  Biopsies were negative for H. pylori. No intestinal metaplasia.

## 2024-10-29 ENCOUNTER — OFFICE VISIT (OUTPATIENT)
Dept: URBAN - METROPOLITAN AREA CLINIC 78 | Facility: CLINIC | Age: 49
End: 2024-10-29
Payer: COMMERCIAL

## 2024-10-29 DIAGNOSIS — K22.10 ULCER OF ESOPHAGUS WITHOUT BLEEDING: ICD-10-CM

## 2024-10-29 DIAGNOSIS — L29.0 ANAL ITCHING: ICD-10-CM

## 2024-10-29 DIAGNOSIS — K59.01 CONSTIPATION: ICD-10-CM

## 2024-10-29 DIAGNOSIS — R11.0 CHRONIC NAUSEA: ICD-10-CM

## 2024-10-29 DIAGNOSIS — K90.41 NCGS (NON-CELIAC GLUTEN SENSITIVITY): ICD-10-CM

## 2024-10-29 DIAGNOSIS — F41.9 ANXIETY: ICD-10-CM

## 2024-10-29 DIAGNOSIS — E66.811 OBESITY, CLASS I, BMI 30-34.9: ICD-10-CM

## 2024-10-29 DIAGNOSIS — K57.90 DIVERTICULOSIS: ICD-10-CM

## 2024-10-29 DIAGNOSIS — R35.0 URINARY FREQUENCY: ICD-10-CM

## 2024-10-29 DIAGNOSIS — K58.1 IRRITABLE BOWEL SYNDROME WITH CONSTIPATION: ICD-10-CM

## 2024-10-29 DIAGNOSIS — K63.8219 SMALL INTESTINAL BACTERIAL OVERGROWTH (SIBO): ICD-10-CM

## 2024-10-29 DIAGNOSIS — F32.9 DEPRESSION, UNSPECIFIED DEPRESSION TYPE: ICD-10-CM

## 2024-10-29 DIAGNOSIS — K64.8 INTERNAL HEMORRHOIDS: ICD-10-CM

## 2024-10-29 DIAGNOSIS — K30 FUNCTIONAL DYSPEPSIA: ICD-10-CM

## 2024-10-29 DIAGNOSIS — K62.89 ANAL IRRITATION: ICD-10-CM

## 2024-10-29 DIAGNOSIS — R12 HEARTBURN: ICD-10-CM

## 2024-10-29 DIAGNOSIS — T78.2XXD ANAPHYLAXIS, SUBSEQUENT ENCOUNTER: ICD-10-CM

## 2024-10-29 PROCEDURE — 46600 DIAGNOSTIC ANOSCOPY SPX: CPT | Performed by: INTERNAL MEDICINE

## 2024-10-29 PROCEDURE — 99214 OFFICE O/P EST MOD 30 MIN: CPT | Performed by: INTERNAL MEDICINE

## 2024-10-29 PROCEDURE — 46221 LIGATION OF HEMORRHOID(S): CPT | Performed by: INTERNAL MEDICINE

## 2024-10-29 RX ORDER — HYOSCYAMINE SULFATE 0.125 MG
1 TABLET ON THE TONGUE AND ALLOW TO DISSOLVE AS NEEDED TABLET,DISINTEGRATING ORAL
Qty: 180 | Refills: 0 | Status: ACTIVE | COMMUNITY

## 2024-10-29 RX ORDER — OMEPRAZOLE AND SODIUM BICARBONATE 40; 1100 MG/1; MG/1
1 CAPSULE ON AN EMPTY STOMACH CAPSULE ORAL ONCE A DAY
Qty: 30 | Status: ACTIVE | COMMUNITY
Start: 2024-10-10

## 2024-10-29 RX ORDER — LINACLOTIDE 145 UG/1
1 CAPSULE AT LEAST 30 MINUTES BEFORE THE FIRST MEAL OF THE DAY ON AN EMPTY STOMACH CAPSULE, GELATIN COATED ORAL ONCE A DAY
Qty: 90 | Refills: 3 | Status: ACTIVE | COMMUNITY
Start: 2024-08-06 | End: 2025-08-01

## 2024-10-29 RX ORDER — ATORVASTATIN CALCIUM 80 MG/1
1 TABLET TABLET, FILM COATED ORAL ONCE A DAY
Status: ACTIVE | COMMUNITY

## 2024-10-29 RX ORDER — CARVEDILOL 12.5 MG/1
1 TABLET WITH FOOD TABLET, FILM COATED ORAL TWICE A DAY
Status: ACTIVE | COMMUNITY

## 2024-10-29 RX ORDER — LINACLOTIDE 290 UG/1
1 CAPSULE AT LEAST 30 MINUTES BEFORE THE FIRST MEAL OF THE DAY ON AN EMPTY STOMACH CAPSULE, GELATIN COATED ORAL ONCE A DAY
Qty: 30 | Status: ACTIVE | COMMUNITY
Start: 2024-10-10 | End: 2024-11-08

## 2024-10-29 RX ORDER — NEOMYCIN SULFATE 500 MG/1
1 TABLET TABLET ORAL
Qty: 28 TABLET | Refills: 0 | Status: ACTIVE | COMMUNITY
Start: 2024-08-06

## 2024-10-29 RX ORDER — ONDANSETRON 4 MG/1
1 TABLET ON THE TONGUE AND ALLOW TO DISSOLVE TABLET, ORALLY DISINTEGRATING ORAL
Qty: 30 TABLET | Refills: 0 | Status: ACTIVE | COMMUNITY
Start: 2024-08-06

## 2024-10-29 RX ORDER — DEXLANSOPRAZOLE 60 MG/1
1 CAPSULE CAPSULE, DELAYED RELEASE ORAL ONCE A DAY
Qty: 30 | Status: ACTIVE | COMMUNITY

## 2024-10-29 RX ORDER — HYOSCYAMINE SULFATE 0.12 MG/1
1 TABLET UNDER THE TONGUE AND ALLOW TO DISSOLVE TABLET, ORALLY DISINTEGRATING ORAL
Qty: 40 | Refills: 2 | Status: ACTIVE | COMMUNITY
Start: 2024-08-06 | End: 2025-05-03

## 2024-10-29 RX ORDER — NIFEDIPINE 60 MG/1
1 TABLET ON AN EMPTY STOMACH TABLET, EXTENDED RELEASE ORAL ONCE A DAY
Status: ACTIVE | COMMUNITY

## 2024-10-29 RX ORDER — GABAPENTIN 300 MG/1
1 CAPSULE CAPSULE ORAL
Status: ACTIVE | COMMUNITY

## 2024-10-29 NOTE — HPI-TODAY'S VISIT:
The patient was referred to us by Dr. Gurvinder Li and initially came to see me for a fifth opinion. A copy of this note will be sent to the referring physician. Previously seen by Milli Radha ALAS/ Dr. Zorna Fulton/Dr. Chio Bullock/ Dr. Santos/ Dr. Saji Adair for chronic abd pain and dyspesia sx.  She has had chronic abdominal pain. She describes the pain as a burning/stabbing sensation. She has tried antispasmodics with partial response.   She has heartburn. She describes retrosternal burning , especially at night. She is taking Famotidine 40mg QHS with little relief. She has tried multiple PPIs (Omeprazole, Nexium, Pantoprazole) She did get some Voquezna samples from Dr. Fernando Li which she felt helped, however her insurance refused to pay for it. Of note she had an esoph ulcer noted on EGD in 2023.   Her insurance continues to refuse to pay for Voquezna.   A few weeks ago she developed severe pain under both her axillae and retrosternal cchest pain accompanied by fevers. The pain was so severe she had to be admitted for pneumonia. She was treated with antibiotics. She developed a allergic reaction (diffuse abodminal erythematous/pruritic rash). She was treated with Steroids and Benadryl.  The rash is much better now.   She does not feel Linzess 145mcg worked for her. On the other hand, IBSrela BID dosing was too much for her. She feels that the Xifaxan helped her quite a bit. She is not having as much flatulence.  She is now using Linzess 290 mcg daily with better results.   She has also noticed severe anal itching. She has seen Dr. Santos. He performed hemorrhoid banding on her years ago. I prescribed Pramoxine cream but the pharmacy told her the insurance would not pay for it unless it had a prior approval.  She paid for the HC suppositories out of pocket. She feels these helped partially.   There is no recent history of rectal bleeding. The patient has no pertinent additional complaints of abdominal pain, constipation, diarrhea, bloating, rectal pain, anorexia or unintentional weight loss.       Regarding any upper GI complaints, the patient has not had heartburn, nausea, vomiting or dysphagia.  Just yesterday she had a stimulator placed to manage increased urinary frequency. For the first time in a long time, she did not have to wake up to urinate last night.       The patient does not take blood thinners.       They deny any CP or WNYN.  She has a history of abdominoplasty, partial hysterectomy, lap eloina.  Previously she had also been seen by Dr. Saji Adair; she has had multiple colonoscopies in the past: 2015, 2020, 2023, informed to repeat in 5-10yrs.The patient has never had a colonoscopy previously. There is no FH of colon cancer or colon polyps.  Summary of prior workup: - SIBO test on 2/4/24 labs on 10/18/2013: Negative celiac panel consistent with malabsorption with excessive hydrogen production but not so much methane production. Treated with Xifaxan.  - Previously seen by Dr. Saji Adair, multiple colonoscopy in the past: 2015, 2020, 2023, informed to repeat in 5-10yrs. - CT A/P on 09/25/23 grossly unremarkable - EGD on 10/2023 w Dr. Fulton revealed: Normal hypopharynx. Esophageal ulcer with no bleeding and no stigmata of recent bleeding at the GEJ. Antral and body erythema. Normal duodenum. 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 Colonoscopy by Dr. Chio Bullock and 5/16/2013 only showed hypertrophied anal papilla.  - Labs in 2013: Neg celiac panel. CRP 3.2. - EGD on 4/30/2013: Normal esophagus, stomach and duodenum.  Biopsies were negative for H. pylori. No intestinal metaplasia.

## 2024-10-29 NOTE — PHYSICAL EXAM GASTROINTESTINAL
Abdomen , soft, nontender, nondistended , no guarding or rigidity , no masses palpable , normal bowel sounds , Liver and Spleen , no hepatomegaly present , no hepatosplenomegaly , liver nontender , spleen not palpable Offered patient a chaperone, but they kindly declined. Perianal exam shows normal skin without irritation. No perianal erythema or fluctuance. Non thrombosed, small external hemorrhoids evident. Internal hemorrhoids grade II noted. No anal fissure on anoscopy. Mild rectal prolapse seen on anoscopy.

## 2024-11-19 ENCOUNTER — OFFICE VISIT (OUTPATIENT)
Dept: URBAN - METROPOLITAN AREA CLINIC 78 | Facility: CLINIC | Age: 49
End: 2024-11-19

## 2024-11-19 RX ORDER — HYOSCYAMINE SULFATE 0.12 MG/1
1 TABLET UNDER THE TONGUE AND ALLOW TO DISSOLVE TABLET, ORALLY DISINTEGRATING ORAL
Qty: 40 | Refills: 2 | OUTPATIENT
Start: 2024-11-19 | End: 2025-08-16

## 2024-11-19 RX ORDER — LINACLOTIDE 145 UG/1
1 CAPSULE AT LEAST 30 MINUTES BEFORE THE FIRST MEAL OF THE DAY ON AN EMPTY STOMACH CAPSULE, GELATIN COATED ORAL ONCE A DAY
Qty: 90 | Refills: 3 | Status: ACTIVE | COMMUNITY
Start: 2024-08-06 | End: 2025-08-01

## 2024-11-19 RX ORDER — NIFEDIPINE 60 MG/1
1 TABLET ON AN EMPTY STOMACH TABLET, EXTENDED RELEASE ORAL ONCE A DAY
Status: ACTIVE | COMMUNITY

## 2024-11-19 RX ORDER — ATORVASTATIN CALCIUM 80 MG/1
1 TABLET TABLET, FILM COATED ORAL ONCE A DAY
Status: ACTIVE | COMMUNITY

## 2024-11-19 RX ORDER — CARVEDILOL 12.5 MG/1
1 TABLET WITH FOOD TABLET, FILM COATED ORAL TWICE A DAY
Status: ACTIVE | COMMUNITY

## 2024-11-19 RX ORDER — ONDANSETRON 4 MG/1
1 TABLET ON THE TONGUE AND ALLOW TO DISSOLVE TABLET, ORALLY DISINTEGRATING ORAL
Qty: 30 TABLET | Refills: 0 | Status: ACTIVE | COMMUNITY
Start: 2024-08-06

## 2024-11-19 RX ORDER — GABAPENTIN 300 MG/1
1 CAPSULE CAPSULE ORAL
Status: ACTIVE | COMMUNITY

## 2024-11-19 RX ORDER — HYOSCYAMINE SULFATE 0.12 MG/1
1 TABLET UNDER THE TONGUE AND ALLOW TO DISSOLVE TABLET, ORALLY DISINTEGRATING ORAL
Qty: 40 | Refills: 2 | Status: ACTIVE | COMMUNITY
Start: 2024-08-06 | End: 2025-05-03

## 2024-11-19 RX ORDER — OMEPRAZOLE AND SODIUM BICARBONATE 40; 1100 MG/1; MG/1
1 CAPSULE ON AN EMPTY STOMACH CAPSULE ORAL ONCE A DAY
Qty: 30 | Status: ACTIVE | COMMUNITY
Start: 2024-10-10

## 2024-11-19 RX ORDER — DEXLANSOPRAZOLE 60 MG/1
1 CAPSULE CAPSULE, DELAYED RELEASE ORAL ONCE A DAY
Qty: 30 | Status: ACTIVE | COMMUNITY

## 2024-11-19 RX ORDER — HYOSCYAMINE SULFATE 0.125 MG
1 TABLET ON THE TONGUE AND ALLOW TO DISSOLVE AS NEEDED TABLET,DISINTEGRATING ORAL
Qty: 180 | Refills: 0 | Status: ACTIVE | COMMUNITY

## 2024-11-19 RX ORDER — NEOMYCIN SULFATE 500 MG/1
1 TABLET TABLET ORAL
Qty: 28 TABLET | Refills: 0 | Status: ACTIVE | COMMUNITY
Start: 2024-08-06

## 2024-11-19 NOTE — PHYSICAL EXAM GASTROINTESTINAL
Abdomen , soft, nontender, nondistended , no guarding or rigidity , no masses palpable , normal bowel sounds , Liver and Spleen , no hepatomegaly present , no hepatosplenomegaly , liver nontender , spleen not palpable Offered patient a chaperone, but they kindly declined. Perianal exam shows normal skin without irritation. No perianal erythema or fluctuance. No thrombosed external hemorrhoids evident. Internal hemorrhoids grade II noted

## 2024-11-19 NOTE — HPI-TODAY'S VISIT:
The patient was referred to us by Dr. Gurvinder Li and initially came to see me for a fifth opinion. A copy of this note will be sent to the referring physician. Previously seen by Milli Radha ALAS/ Dr. Zoran Fulton/Dr. Chio Bullock/ Dr. Santos/ Dr. Saji Adair for chronic abd pain and dyspesia sx.  She has had chronic abdominal pain. She describes the pain as a burning/stabbing sensation. She has tried antispasmodics with partial response.   She has heartburn. She describes retrosternal burning , especially at night. She is taking Famotidine 40mg QHS with little relief. She has tried multiple PPIs (Omeprazole, Nexium, Pantoprazole) She did get some Voquezna samples from Dr. Fernando Li which she felt helped, however her insurance refused to pay for it. Of note she had an esoph ulcer noted on EGD in 2023.   Her insurance continues to refuse to pay for Voquezna.   A few weeks ago she developed severe pain under both her axillae and retrosternal cchest pain accompanied by fevers. The pain was so severe she had to be admitted for pneumonia. She was treated with antibiotics. She developed a allergic reaction (diffuse abodminal erythematous/pruritic rash). She was treated with Steroids and Benadryl.  The rash is much better now.   She does not feel Linzess 145mcg worked for her. On the other hand, IBSrela BID dosing was too much for her. She feels that the Xifaxan helped her quite a bit. She is not having as much flatulence.  She is now using Linzess 290 mcg daily with better results.   She has also noticed severe anal itching. She has seen Dr. Santos. He performed hemorrhoid banding on her years ago. I prescribed Pramoxine cream but the pharmacy told her the insurance would not pay for it unless it had a prior approval.  She paid for the HC suppositories out of pocket. She feels these helped partially.   There is no recent history of rectal bleeding. The patient has no pertinent additional complaints of abdominal pain, constipation, diarrhea, bloating, rectal pain, anorexia or unintentional weight loss.       Regarding any upper GI complaints, the patient has not had heartburn, nausea, vomiting or dysphagia.  Just yesterday she had a stimulator placed to manage increased urinary frequency. For the first time in a long time, she did not have to wake up to urinate last night.       The patient does not take blood thinners.       They deny any CP or WYNN.  She just had a sacral nerve stimulator placed yesterday. She is having quite a bit of discomfort from this and woud like to hold off on pursuing further banding today.  SHe had no issues following banding of theb LL banding on her last occasion. She has also been using the HC ointemnt which has helped with the itching.   She started Zegerid which has been helping with the heartburn and epigastric burning.  She has beennusing Benefiber and Miralax daily,.she was just given Tramadol yesterdyay,   She has some abdominal cramping. She is wondering whether she can be started with an antispasmodic.  She has a history of abdominoplasty, partial hysterectomy, lap eloina.  Previously she had also been seen by Dr. Saji Adair; she has had multiple colonoscopies in the past: 2015, 2020, 2023, informed to repeat in 5-10yrs.The patient has never had a colonoscopy previously. There is no FH of colon cancer or colon polyps.  Patient was seen at [facility] for evaluation of bariatric surgery by Dr. Jose Beauchamp. She has also met with the Nutritionist.  Summary of prior workup: - SIBO test on 2/4/24 labs on 10/18/2013: Negative celiac panel consistent with malabsorption with excessive hydrogen production but not so much methane production. Treated with Xifaxan.  - Previously seen by Dr. Saji Adair, multiple colonoscopy in the past: 2015, 2020, 2023, informed to repeat in 5-10yrs. - CT A/P on 09/25/23 grossly unremarkable - EGD on 10/2023 w Dr. Fulton revealed: Normal hypopharynx. Esophageal ulcer with no bleeding and no stigmata of recent bleeding at the GEJ. Antral and body erythema. Normal duodenum. 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 Colonoscopy by Dr. Chio Bullock and 5/16/2013 only showed hypertrophied anal papilla.  - Labs in 2013: Neg celiac panel. CRP 3.2. - EGD on 4/30/2013: Normal esophagus, stomach and duodenum.  Biopsies were negative for H. pylori. No intestinal metaplasia.

## 2025-01-10 ENCOUNTER — OFFICE VISIT (OUTPATIENT)
Dept: URBAN - METROPOLITAN AREA TELEHEALTH 2 | Facility: TELEHEALTH | Age: 50
End: 2025-01-10
Payer: COMMERCIAL

## 2025-01-10 DIAGNOSIS — F32.9 DEPRESSION, UNSPECIFIED DEPRESSION TYPE: ICD-10-CM

## 2025-01-10 DIAGNOSIS — R35.0 URINARY FREQUENCY: ICD-10-CM

## 2025-01-10 DIAGNOSIS — K58.1 IRRITABLE BOWEL SYNDROME WITH CONSTIPATION: ICD-10-CM

## 2025-01-10 DIAGNOSIS — K62.89 ANAL IRRITATION: ICD-10-CM

## 2025-01-10 DIAGNOSIS — K63.8219 SMALL INTESTINAL BACTERIAL OVERGROWTH (SIBO): ICD-10-CM

## 2025-01-10 DIAGNOSIS — K30 FUNCTIONAL DYSPEPSIA: ICD-10-CM

## 2025-01-10 DIAGNOSIS — R11.0 CHRONIC NAUSEA: ICD-10-CM

## 2025-01-10 DIAGNOSIS — K64.8 INTERNAL HEMORRHOIDS: ICD-10-CM

## 2025-01-10 DIAGNOSIS — K22.10 ULCER OF ESOPHAGUS WITHOUT BLEEDING: ICD-10-CM

## 2025-01-10 DIAGNOSIS — F41.9 ANXIETY: ICD-10-CM

## 2025-01-10 DIAGNOSIS — K57.90 DIVERTICULOSIS: ICD-10-CM

## 2025-01-10 DIAGNOSIS — T78.2XXD ANAPHYLAXIS, SUBSEQUENT ENCOUNTER: ICD-10-CM

## 2025-01-10 DIAGNOSIS — E66.811 OBESITY, CLASS I, BMI 30-34.9: ICD-10-CM

## 2025-01-10 DIAGNOSIS — K59.01 CONSTIPATION: ICD-10-CM

## 2025-01-10 DIAGNOSIS — R12 HEARTBURN: ICD-10-CM

## 2025-01-10 DIAGNOSIS — L29.0 ANAL ITCHING: ICD-10-CM

## 2025-01-10 DIAGNOSIS — R10.9 ABDOMINAL CRAMPS: ICD-10-CM

## 2025-01-10 DIAGNOSIS — K90.41 NCGS (NON-CELIAC GLUTEN SENSITIVITY): ICD-10-CM

## 2025-01-10 PROCEDURE — 98006 SYNCH AUDIO-VIDEO EST MOD 30: CPT | Performed by: INTERNAL MEDICINE

## 2025-01-10 PROCEDURE — 99214 OFFICE O/P EST MOD 30 MIN: CPT | Performed by: INTERNAL MEDICINE

## 2025-01-10 RX ORDER — HYOSCYAMINE SULFATE 0.12 MG/1
1 TABLET UNDER THE TONGUE AND ALLOW TO DISSOLVE TABLET, ORALLY DISINTEGRATING ORAL
Qty: 40 | Refills: 2 | Status: ACTIVE | COMMUNITY
Start: 2024-11-19 | End: 2025-08-16

## 2025-01-10 RX ORDER — HYOSCYAMINE SULFATE 0.125 MG
1 TABLET ON THE TONGUE AND ALLOW TO DISSOLVE AS NEEDED TABLET,DISINTEGRATING ORAL
Qty: 180 | Refills: 0 | Status: ACTIVE | COMMUNITY

## 2025-01-10 RX ORDER — NIFEDIPINE 60 MG/1
1 TABLET ON AN EMPTY STOMACH TABLET, EXTENDED RELEASE ORAL ONCE A DAY
Status: ACTIVE | COMMUNITY

## 2025-01-10 RX ORDER — CARVEDILOL 12.5 MG/1
1 TABLET WITH FOOD TABLET, FILM COATED ORAL TWICE A DAY
Status: ACTIVE | COMMUNITY

## 2025-01-10 RX ORDER — HYOSCYAMINE SULFATE 0.12 MG/1
1 TABLET UNDER THE TONGUE AND ALLOW TO DISSOLVE TABLET, ORALLY DISINTEGRATING ORAL
Qty: 40 | Refills: 2 | OUTPATIENT

## 2025-01-10 RX ORDER — ATORVASTATIN CALCIUM 80 MG/1
1 TABLET TABLET, FILM COATED ORAL ONCE A DAY
Status: ACTIVE | COMMUNITY

## 2025-01-10 RX ORDER — DEXLANSOPRAZOLE 60 MG/1
1 CAPSULE CAPSULE, DELAYED RELEASE ORAL ONCE A DAY
Qty: 30 | Status: ACTIVE | COMMUNITY

## 2025-01-10 RX ORDER — NEOMYCIN SULFATE 500 MG/1
1 TABLET TABLET ORAL
Qty: 28 TABLET | Refills: 0 | Status: ACTIVE | COMMUNITY
Start: 2024-08-06

## 2025-01-10 RX ORDER — HYOSCYAMINE SULFATE 0.12 MG/1
1 TABLET UNDER THE TONGUE AND ALLOW TO DISSOLVE TABLET, ORALLY DISINTEGRATING ORAL
Qty: 40 | Refills: 2 | Status: ACTIVE | COMMUNITY
Start: 2024-08-06 | End: 2025-05-03

## 2025-01-10 RX ORDER — OMEPRAZOLE AND SODIUM BICARBONATE 40; 1100 MG/1; MG/1
1 CAPSULE ON AN EMPTY STOMACH CAPSULE ORAL ONCE A DAY
Qty: 30 | Status: ACTIVE | COMMUNITY
Start: 2024-10-10

## 2025-01-10 RX ORDER — GABAPENTIN 300 MG/1
1 CAPSULE CAPSULE ORAL
Status: ACTIVE | COMMUNITY

## 2025-01-10 RX ORDER — LINACLOTIDE 145 UG/1
1 CAPSULE AT LEAST 30 MINUTES BEFORE THE FIRST MEAL OF THE DAY ON AN EMPTY STOMACH CAPSULE, GELATIN COATED ORAL ONCE A DAY
Qty: 90 | Refills: 3 | Status: ACTIVE | COMMUNITY
Start: 2024-08-06 | End: 2025-08-01

## 2025-01-10 RX ORDER — ONDANSETRON 4 MG/1
1 TABLET ON THE TONGUE AND ALLOW TO DISSOLVE TABLET, ORALLY DISINTEGRATING ORAL
Qty: 30 TABLET | Refills: 0 | Status: ACTIVE | COMMUNITY
Start: 2024-08-06

## 2025-01-10 NOTE — HPI-TODAY'S VISIT:
The patient was referred to us by Dr. Gurvinder Li for symptomatic hemorrhoids, chronic abdominal pain and heartburn. A copy of this note will be sent to the referring physician.  She has had chronic abdominal pain. She describes the pain as a burning/stabbing sensation. She has tried antispasmodics with partial response.   She has heartburn. She describes retrosternal burning , especially at night. She is taking Famotidine 40mg QHS with little relief. She has tried multiple PPIs (Omeprazole, Nexium, Pantoprazole) She did get some Voquezna samples from Dr. Fernando Li which she felt helped. Of note she had an esoph ulcer noted on EGD in 2023.  Her insurance continues to refuse to pay for Voquezna. I recommended she start Zegerid instead which has been helping with the heartburn and epigastric burning.  On 11/19/24 she had a sacral nerve stimulator placed. It is not working well. Moreover she has had pain in her leg since, worsening consitpation and even had a UTI.  She is having quite a bit of discomfort from this. Her Urologist and [employer]  are working on changing the programming but she feels miserable overall and wants it taken out.   She states that her stools are small hard pellets. She is also having a lot of abdominal cramping.  She is using Benefiber and Senna x2 QHS.   She does not feel Linzess 145mcg worked for her. Linzess 290 mcg daily provided better results.  IBSrela BID dosing was too much for her. She has been using Benefiber and Miralax daily. She is still using Tramadol BID for back pain.   She feels that the Xifaxan helped her quite a bit. She is not having as much flatulence.   She has also noticed severe anal itching. She had seen Dr. Santos who performed hemorrhoid banding on her years ago. I prescribed Pramoxine cream but the pharmacy told her the insurance would not pay for it unless it had a prior approval. She paid for the HC suppositories out of pocket. She feels these helped partially.   I performed hemorrhoid banding. She had no issues following banding of the LL banding, but we have not pursued anymore since the stimulator has been in place.  She has also been using the HC ointemnt which has helped with the itching.   No anorexia or unintentional weight loss.    Regarding any upper GI complaints, the patient has not had heartburn, nausea, vomiting or dysphagia.     The patient does not take blood thinners.       They deny any CP or WYNN.  She has a history of abdominoplasty, partial hysterectomy, lap eloina.  Previously she had also been seen by Dr. Saji Adair; she has had multiple colonoscopies in the past: 2015, 2020, 2023, informed to repeat in 5-10yrs. There is no FH of colon cancer or colon polyps.  Patient was seen at [facility] for evaluation of bariatric surgery by Dr. Jose Beauchamp. She has also met with the Nutritionist. He jsut did an EGD on her and is scheduled for a CT. She is following a strict diet and will be seeing a Psychologist.   Summary of prior workup: - SIBO test on 2/4/24 labs on 10/18/2013: Negative celiac panel consistent with malabsorption with excessive hydrogen production but not so much methane production. Treated with Xifaxan.  - Previously seen by Dr. Saji Adair, multiple colonoscopy in the past: 2015, 2020, 2023, informed to repeat in 5-10yrs. - CT A/P on 09/25/23 grossly unremarkable - EGD on 10/2023 w Dr. Fulton revealed: Normal hypopharynx. Esophageal ulcer with no bleeding and no stigmata of recent bleeding at the GEJ. Antral and body erythema. Normal duodenum. 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 Colonoscopy by Dr. Chio Bullock and 5/16/2013 only showed hypertrophied anal papilla.  - Labs in 2013: Neg celiac panel. CRP 3.2. - EGD on 4/30/2013: Normal esophagus, stomach and duodenum.  Biopsies were negative for H. pylori. No intestinal metaplasia.

## 2025-01-13 ENCOUNTER — TELEPHONE ENCOUNTER (OUTPATIENT)
Dept: URBAN - METROPOLITAN AREA CLINIC 78 | Facility: CLINIC | Age: 50
End: 2025-01-13

## 2025-01-13 ENCOUNTER — OFFICE VISIT (OUTPATIENT)
Dept: URBAN - METROPOLITAN AREA CLINIC 78 | Facility: CLINIC | Age: 50
End: 2025-01-13

## 2025-02-06 ENCOUNTER — OFFICE VISIT (OUTPATIENT)
Dept: URBAN - METROPOLITAN AREA CLINIC 78 | Facility: CLINIC | Age: 50
End: 2025-02-06
Payer: COMMERCIAL

## 2025-02-06 VITALS
TEMPERATURE: 98 F | DIASTOLIC BLOOD PRESSURE: 108 MMHG | HEIGHT: 66 IN | WEIGHT: 182 LBS | HEART RATE: 97 BPM | SYSTOLIC BLOOD PRESSURE: 165 MMHG | BODY MASS INDEX: 29.25 KG/M2

## 2025-02-06 DIAGNOSIS — K22.10 ULCER OF ESOPHAGUS WITHOUT BLEEDING: ICD-10-CM

## 2025-02-06 DIAGNOSIS — R10.9 ABDOMINAL CRAMPS: ICD-10-CM

## 2025-02-06 DIAGNOSIS — E66.811 OBESITY, CLASS I, BMI 30-34.9: ICD-10-CM

## 2025-02-06 DIAGNOSIS — L29.0 ANAL ITCHING: ICD-10-CM

## 2025-02-06 DIAGNOSIS — K57.90 DIVERTICULOSIS: ICD-10-CM

## 2025-02-06 DIAGNOSIS — K58.1 IRRITABLE BOWEL SYNDROME WITH CONSTIPATION: ICD-10-CM

## 2025-02-06 DIAGNOSIS — K90.41 NCGS (NON-CELIAC GLUTEN SENSITIVITY): ICD-10-CM

## 2025-02-06 DIAGNOSIS — T78.2XXD ANAPHYLAXIS, SUBSEQUENT ENCOUNTER: ICD-10-CM

## 2025-02-06 DIAGNOSIS — K62.89 ANAL IRRITATION: ICD-10-CM

## 2025-02-06 DIAGNOSIS — F32.9 DEPRESSION, UNSPECIFIED DEPRESSION TYPE: ICD-10-CM

## 2025-02-06 DIAGNOSIS — K59.01 CONSTIPATION: ICD-10-CM

## 2025-02-06 DIAGNOSIS — K64.8 INTERNAL HEMORRHOIDS: ICD-10-CM

## 2025-02-06 DIAGNOSIS — R12 HEARTBURN: ICD-10-CM

## 2025-02-06 DIAGNOSIS — K63.8219 SMALL INTESTINAL BACTERIAL OVERGROWTH (SIBO): ICD-10-CM

## 2025-02-06 DIAGNOSIS — K30 FUNCTIONAL DYSPEPSIA: ICD-10-CM

## 2025-02-06 DIAGNOSIS — R11.0 CHRONIC NAUSEA: ICD-10-CM

## 2025-02-06 DIAGNOSIS — R35.0 URINARY FREQUENCY: ICD-10-CM

## 2025-02-06 DIAGNOSIS — F41.9 ANXIETY: ICD-10-CM

## 2025-02-06 PROCEDURE — 99214 OFFICE O/P EST MOD 30 MIN: CPT | Performed by: INTERNAL MEDICINE

## 2025-02-06 RX ORDER — TOLTERODINE TARTRATE 4 MG/1
CAPSULE, EXTENDED RELEASE ORAL
Qty: 30 CAPSULE | Status: ACTIVE | COMMUNITY

## 2025-02-06 RX ORDER — TROSPIUM CHLORIDE 20 MG/1
TABLET, FILM COATED ORAL
Qty: 30 TABLET | Status: ACTIVE | COMMUNITY

## 2025-02-06 RX ORDER — CARVEDILOL 12.5 MG/1
1 TABLET WITH FOOD TABLET, FILM COATED ORAL TWICE A DAY
Status: ACTIVE | COMMUNITY

## 2025-02-06 RX ORDER — HYOSCYAMINE SULFATE 0.12 MG/1
1 TABLET UNDER THE TONGUE AND ALLOW TO DISSOLVE TABLET, ORALLY DISINTEGRATING ORAL
Qty: 40 | Refills: 2 | Status: ACTIVE | COMMUNITY
Start: 2024-08-06 | End: 2025-05-03

## 2025-02-06 RX ORDER — HYOSCYAMINE SULFATE 0.12 MG/1
1 TABLET UNDER THE TONGUE AND ALLOW TO DISSOLVE TABLET, ORALLY DISINTEGRATING ORAL
Qty: 40 | Refills: 2 | Status: ACTIVE | COMMUNITY

## 2025-02-06 RX ORDER — SOLIFENACIN SUCCINATE 10 MG/1
TABLET, FILM COATED ORAL
Qty: 90 TABLET | Status: ACTIVE | COMMUNITY

## 2025-02-06 RX ORDER — ONDANSETRON 4 MG/1
1 TABLET ON THE TONGUE AND ALLOW TO DISSOLVE TABLET, ORALLY DISINTEGRATING ORAL
Qty: 30 TABLET | Refills: 0
Start: 2024-08-06

## 2025-02-06 RX ORDER — DEXLANSOPRAZOLE 60 MG/1
1 CAPSULE CAPSULE, DELAYED RELEASE ORAL ONCE A DAY
Qty: 30 | Status: ACTIVE | COMMUNITY

## 2025-02-06 RX ORDER — ATORVASTATIN CALCIUM 80 MG/1
1 TABLET TABLET, FILM COATED ORAL ONCE A DAY
Status: ACTIVE | COMMUNITY

## 2025-02-06 RX ORDER — GABAPENTIN 300 MG/1
1 CAPSULE CAPSULE ORAL
Status: ACTIVE | COMMUNITY

## 2025-02-06 RX ORDER — OMEPRAZOLE AND SODIUM BICARBONATE 40; 1100 MG/1; MG/1
1 CAPSULE ON AN EMPTY STOMACH CAPSULE ORAL ONCE A DAY
Qty: 30 | Status: ACTIVE | COMMUNITY
Start: 2024-10-10

## 2025-02-06 RX ORDER — HYOSCYAMINE SULFATE 0.12 MG/1
1 TABLET UNDER THE TONGUE AND ALLOW TO DISSOLVE TABLET, ORALLY DISINTEGRATING ORAL
Qty: 40 | Refills: 2 | OUTPATIENT

## 2025-02-06 RX ORDER — NIFEDIPINE 60 MG/1
1 TABLET ON AN EMPTY STOMACH TABLET, EXTENDED RELEASE ORAL ONCE A DAY
Status: ACTIVE | COMMUNITY

## 2025-02-06 RX ORDER — NEOMYCIN SULFATE 500 MG/1
1 TABLET TABLET ORAL
Qty: 28 TABLET | Refills: 0 | Status: ACTIVE | COMMUNITY
Start: 2024-08-06

## 2025-02-06 RX ORDER — LINACLOTIDE 145 UG/1
1 CAPSULE AT LEAST 30 MINUTES BEFORE THE FIRST MEAL OF THE DAY ON AN EMPTY STOMACH CAPSULE, GELATIN COATED ORAL ONCE A DAY
Qty: 90 | Refills: 3 | Status: ACTIVE | COMMUNITY
Start: 2024-08-06 | End: 2025-08-01

## 2025-02-06 RX ORDER — ONDANSETRON 4 MG/1
1 TABLET ON THE TONGUE AND ALLOW TO DISSOLVE TABLET, ORALLY DISINTEGRATING ORAL
Qty: 30 TABLET | Refills: 0 | Status: ACTIVE | COMMUNITY
Start: 2024-08-06

## 2025-02-06 RX ORDER — HYOSCYAMINE SULFATE 0.125 MG
1 TABLET ON THE TONGUE AND ALLOW TO DISSOLVE AS NEEDED TABLET,DISINTEGRATING ORAL
Qty: 180 | Refills: 0 | Status: ACTIVE | COMMUNITY

## 2025-02-06 RX ORDER — HYDROCORTISONE 25 MG/G
1 APPLICATION CREAM TOPICAL TWICE A DAY
Qty: 1 | Refills: 1

## 2025-02-06 NOTE — HPI-TODAY'S VISIT:
The patient was referred to us by Dr. Gurvinder Li for symptomatic hemorrhoids, chronic abdominal pain and heartburn. A copy of this note will be sent to the referring physician.  She has had chronic abdominal pain. She describes the pain as a burning/stabbing sensation. She has tried antispasmodics with partial response.   She has heartburn. She describes retrosternal burning , especially at night. She is taking Famotidine 40mg QHS with little relief. She has tried multiple PPIs (Omeprazole, Nexium, Pantoprazole) She did get some Voquezna samples from Dr. Fernando Li which she felt helped. Of note she had an esoph ulcer noted on EGD in 2023.  Her insurance continues to refuse to pay for Voquezna. I recommended she start Zegerid instead which has been helping with the heartburn and epigastric burning.  On 11/19/24 she had a sacral nerve stimulator placed. It is not working well. Moreover she has had pain in her leg since, worsening consitpation and even had a UTI.  She is having quite a bit of discomfort from this. She will finally have the stimulator removed next week.  She states that her stools are small hard pellets. She is also having a lot of abdominal cramping.  She is using Benefiber and Senna x2 QHS.  She does not feel Linzess 145mcg worked for her. Linzess 290 mcg daily provided better results.  IBSrela BID dosing was too much for her and caused diarrhea. She has been using Benefiber and Miralax daily. She is still using Tramadol BID for back pain.  She feels that the Xifaxan helped quite a bit with the flatulence.   She has also noticed severe anal itching. She had seen Dr. Santos who performed hemorrhoid banding on her years ago. I prescribed Pramoxine cream but the pharmacy told her the insurance would not pay for it unless it had a prior approval. She paid for the HC suppositories out of pocket. She feels these helped partially.   I performed hemorrhoid banding. She had no issues following banding of the LL banding, but we have not pursued anymore since the stimulator has been in place.  She has also been using the HC ointment which has helped with the itching.   No anorexia or unintentional weight loss.     The patient does not take blood thinners.       They deny any CP or WYNN.  She has a history of abdominoplasty, partial hysterectomy, lap eloina.  Previously she had been seen by Dr. Saji Adair; she has had multiple colonoscopies in the past: 2015, 2020, 2023, informed to repeat in 5-10yrs. There is no FH of colon cancer or colon polyps.  Patient was seen at [facility] for evaluation of bariatric surgery by Dr. Jose Beauchamp. She has also met with the Nutritionist. EGD and CT were done. She is following a strict diet and saw the Psychologist. She is still awaiting to get approval for the surgery from her insurance.  Summary of prior workup: - SIBO test on 2/4/24 labs on 10/18/2013: Negative celiac panel consistent with malabsorption with excessive hydrogen production but not so much methane production. Treated with Xifaxan.  - Previously seen by Dr. Saji Adair, multiple colonoscopy in the past: 2015, 2020, 2023, informed to repeat in 5-10yrs. - CT A/P on 09/25/23 grossly unremarkable - EGD on 10/2023 w Dr. Fulton revealed: Normal hypopharynx. Esophageal ulcer with no bleeding and no stigmata of recent bleeding at the GEJ. Antral and body erythema. Normal duodenum. 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 Colonoscopy by Dr. Chio Bullock and 5/16/2013 only showed hypertrophied anal papilla.  - Labs in 2013: Neg celiac panel. CRP 3.2. - EGD on 4/30/2013: Normal esophagus, stomach and duodenum.  Biopsies were negative for H. pylori. No intestinal metaplasia.

## 2025-03-06 ENCOUNTER — OFFICE VISIT (OUTPATIENT)
Dept: URBAN - METROPOLITAN AREA CLINIC 78 | Facility: CLINIC | Age: 50
End: 2025-03-06

## 2025-03-31 ENCOUNTER — OFFICE VISIT (OUTPATIENT)
Dept: URBAN - METROPOLITAN AREA CLINIC 78 | Facility: CLINIC | Age: 50
End: 2025-03-31

## 2025-03-31 RX ORDER — ATORVASTATIN CALCIUM 80 MG/1
1 TABLET TABLET, FILM COATED ORAL ONCE A DAY
Status: ACTIVE | COMMUNITY

## 2025-03-31 RX ORDER — HYDROCORTISONE 25 MG/G
1 APPLICATION CREAM TOPICAL TWICE A DAY
Qty: 1 | Refills: 1 | Status: ACTIVE | COMMUNITY

## 2025-03-31 RX ORDER — NEOMYCIN SULFATE 500 MG/1
1 TABLET TABLET ORAL
Qty: 28 TABLET | Refills: 0 | Status: ACTIVE | COMMUNITY
Start: 2024-08-06

## 2025-03-31 RX ORDER — SOLIFENACIN SUCCINATE 10 MG/1
TABLET, FILM COATED ORAL
Qty: 90 TABLET | Status: ACTIVE | COMMUNITY

## 2025-03-31 RX ORDER — NIFEDIPINE 60 MG/1
1 TABLET ON AN EMPTY STOMACH TABLET, EXTENDED RELEASE ORAL ONCE A DAY
Status: ACTIVE | COMMUNITY

## 2025-03-31 RX ORDER — CARVEDILOL 12.5 MG/1
1 TABLET WITH FOOD TABLET, FILM COATED ORAL TWICE A DAY
Status: ACTIVE | COMMUNITY

## 2025-03-31 RX ORDER — GABAPENTIN 300 MG/1
1 CAPSULE CAPSULE ORAL
Status: ACTIVE | COMMUNITY

## 2025-03-31 RX ORDER — HYDROCORTISONE 25 MG/G
1 APPLICATION CREAM TOPICAL TWICE A DAY
Qty: 1 | Refills: 1
Start: 2025-03-31

## 2025-03-31 RX ORDER — LINACLOTIDE 145 UG/1
1 CAPSULE AT LEAST 30 MINUTES BEFORE THE FIRST MEAL OF THE DAY ON AN EMPTY STOMACH CAPSULE, GELATIN COATED ORAL ONCE A DAY
Qty: 90 | Refills: 3 | Status: ACTIVE | COMMUNITY
Start: 2024-08-06 | End: 2025-08-01

## 2025-03-31 RX ORDER — HYOSCYAMINE SULFATE 0.12 MG/1
1 TABLET UNDER THE TONGUE AND ALLOW TO DISSOLVE TABLET, ORALLY DISINTEGRATING ORAL
Qty: 40 | Refills: 2 | Status: ACTIVE | COMMUNITY

## 2025-03-31 RX ORDER — HYOSCYAMINE SULFATE 0.12 MG/1
1 TABLET UNDER THE TONGUE AND ALLOW TO DISSOLVE TABLET, ORALLY DISINTEGRATING ORAL
Qty: 40 | Refills: 2 | OUTPATIENT
Start: 2025-03-31

## 2025-03-31 RX ORDER — RIFAXIMIN 550 MG/1
1 TABLET TABLET ORAL THREE TIMES A DAY
Qty: 42 TABLET | Refills: 0
Start: 2024-08-06 | End: 2025-04-14

## 2025-03-31 RX ORDER — DEXLANSOPRAZOLE 60 MG/1
1 CAPSULE CAPSULE, DELAYED RELEASE ORAL ONCE A DAY
Qty: 30 | Status: ACTIVE | COMMUNITY

## 2025-03-31 RX ORDER — TOLTERODINE TARTRATE 4 MG/1
CAPSULE, EXTENDED RELEASE ORAL
Qty: 30 CAPSULE | Status: ACTIVE | COMMUNITY

## 2025-03-31 RX ORDER — HYOSCYAMINE SULFATE 0.12 MG/1
1 TABLET UNDER THE TONGUE AND ALLOW TO DISSOLVE TABLET, ORALLY DISINTEGRATING ORAL
Qty: 40 | Refills: 2 | Status: ACTIVE | COMMUNITY
Start: 2024-08-06 | End: 2025-05-03

## 2025-03-31 RX ORDER — ONDANSETRON 4 MG/1
1 TABLET ON THE TONGUE AND ALLOW TO DISSOLVE TABLET, ORALLY DISINTEGRATING ORAL
Qty: 30 TABLET | Refills: 0
Start: 2025-03-31

## 2025-03-31 RX ORDER — HYOSCYAMINE SULFATE 0.125 MG
1 TABLET ON THE TONGUE AND ALLOW TO DISSOLVE AS NEEDED TABLET,DISINTEGRATING ORAL
Qty: 180 | Refills: 0 | Status: ACTIVE | COMMUNITY

## 2025-03-31 RX ORDER — ONDANSETRON 4 MG/1
1 TABLET ON THE TONGUE AND ALLOW TO DISSOLVE TABLET, ORALLY DISINTEGRATING ORAL
Qty: 30 TABLET | Refills: 0 | Status: ACTIVE | COMMUNITY
Start: 2024-08-06

## 2025-03-31 RX ORDER — TROSPIUM CHLORIDE 20 MG/1
TABLET, FILM COATED ORAL
Qty: 30 TABLET | Status: ACTIVE | COMMUNITY

## 2025-03-31 RX ORDER — OMEPRAZOLE AND SODIUM BICARBONATE 40; 1100 MG/1; MG/1
1 CAPSULE ON AN EMPTY STOMACH CAPSULE ORAL ONCE A DAY
Qty: 30 | Status: ACTIVE | COMMUNITY
Start: 2024-10-10

## 2025-03-31 NOTE — HPI-TODAY'S VISIT:
The patient was referred to us by Dr. Gurvinder Li for symptomatic hemorrhoids, chronic abdominal pain and heartburn. A copy of this note will be sent to the referring physician.  She has had chronic abdominal pain. She describes the pain as a burning/stabbing sensation. She has tried antispasmodics with partial response.   She has heartburn. She describes retrosternal burning , especially at night. She is taking Famotidine 40mg QHS with little relief. She has tried multiple PPIs (Omeprazole, Nexium, Pantoprazole) She did get some Voquezna samples from Dr. Fernando Li which she felt helped. Of note she had an esoph ulcer noted on EGD in 2023.  Her insurance continues to refuse to pay for Voquezna. I recommended she start Zegerid instead which has been helping with the heartburn and epigastric burning.  On 11/19/24 she had a sacral nerve stimulator placed. It is not working well. Moreover she has had pain in her leg since, worsening consitpation and even had a UTI.  She is having quite a bit of discomfort from this. She will finally have the stimulator removed next week.  She states that her stools are small hard pellets. She is also having a lot of abdominal cramping.  She is using Benefiber and Senna x2 QHS.  She does not feel Linzess 145mcg worked for her. Linzess 290 mcg daily provided better results.  IBSrela BID dosing was too much for her and caused diarrhea. She has been using Benefiber and Miralax daily. She is still using Tramadol BID for back pain.  * She had the stimulator removed on Feb 10th. Sganitra is taking IBSrela regularly and feels that this is working well for her,. She is only using it once a day but having 5-6 BMs a day.  she is having a lot of infraumbilical abdominal pian. Levsin helps mainly with cramping up in the epigastric regio, but not with the pain in the lower abdomen.  Once again having increased flatulence.   She isstill ahving nausea well controlled She is having a lot of anal itching but the itchiing is now spreading to below the buttocks area and down the back of her thighs.  She is now also feeling a constant fatigue.  She was diagnoesd with Giardia twice while living in the [place] and wonders whether she has this infection again.   She feels that the Xifaxan helped quite a bit with the flatulence.   She has also noticed severe anal itching. She had seen Dr. Santos who performed hemorrhoid banding on her years ago. I prescribed Pramoxine cream but the pharmacy told her the insurance would not pay for it unless it had a prior approval. She paid for the HC suppositories out of pocket. She feels these helped partially.   I performed hemorrhoid banding. She had no issues following banding of the LL banding, but we have not pursued anymore since the stimulator has been in place.  She has also been using the HC ointment which has helped with the itching.  *She does not feel that the hemorrhoids are   *She will be seeing Dr. Gurvinder King later today. He has done a lot of evaluation lately including labs and Pelvic/TV US   She was also noted to ahve HPV and was recommended to have a colposcopy for April 4th.   No anorexia or unintentional weight loss.     The patient does not take blood thinners.       They deny any CP or WYNN.  She has a history of abdominoplasty, partial hysterectomy, lap eloina.  Previously she had been seen by Dr. Saji Adair; she has had multiple colonoscopies in the past: 2015, 2020, 2023, informed to repeat in 5-10yrs. There is no FH of colon cancer or colon polyps.  Patient was seen at [facility] for evaluation of bariatric surgery by Dr. Jose Beauchamp. She has also met with the Nutritionist. EGD and CT were done. She is following a strict diet and saw the Psychologist. She is still awaiting to get approval for the surgery from her insurance.  Summary of prior workup: - SIBO test on 2/4/24 labs on 10/18/2013: Negative celiac panel consistent with malabsorption with excessive hydrogen production but not so much methane production. Treated with Xifaxan.  - Previously seen by Dr. Saji Adair, multiple colonoscopy in the past: 2015, 2020, 2023, informed to repeat in 5-10yrs. - CT A/P on 09/25/23 grossly unremarkable - EGD on 10/2023 w Dr. Fulton revealed: Normal hypopharynx. Esophageal ulcer with no bleeding and no stigmata of recent bleeding at the GEJ. Antral and body erythema. Normal duodenum. 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 Colonoscopy by Dr. Chio Bullock and 5/16/2013 only showed hypertrophied anal papilla.  - Labs in 2013: Neg celiac panel. CRP 3.2. - EGD on 4/30/2013: Normal esophagus, stomach and duodenum.  Biopsies were negative for H. pylori. No intestinal metaplasia.

## 2025-04-01 ENCOUNTER — TELEPHONE ENCOUNTER (OUTPATIENT)
Dept: URBAN - METROPOLITAN AREA CLINIC 78 | Facility: CLINIC | Age: 50
End: 2025-04-01

## 2025-06-03 ENCOUNTER — TELEPHONE ENCOUNTER (OUTPATIENT)
Dept: URBAN - METROPOLITAN AREA CLINIC 78 | Facility: CLINIC | Age: 50
End: 2025-06-03

## 2025-06-03 ENCOUNTER — OFFICE VISIT (OUTPATIENT)
Dept: URBAN - METROPOLITAN AREA CLINIC 78 | Facility: CLINIC | Age: 50
End: 2025-06-03
Payer: COMMERCIAL

## 2025-06-03 DIAGNOSIS — K58.1 IRRITABLE BOWEL SYNDROME WITH CONSTIPATION: ICD-10-CM

## 2025-06-03 DIAGNOSIS — K63.8219 SMALL INTESTINAL BACTERIAL OVERGROWTH (SIBO): ICD-10-CM

## 2025-06-03 DIAGNOSIS — K30 FUNCTIONAL DYSPEPSIA: ICD-10-CM

## 2025-06-03 DIAGNOSIS — K62.89 ANAL IRRITATION: ICD-10-CM

## 2025-06-03 PROBLEM — 59621000: Status: ACTIVE | Noted: 2025-06-03

## 2025-06-03 PROCEDURE — 99214 OFFICE O/P EST MOD 30 MIN: CPT | Performed by: INTERNAL MEDICINE

## 2025-06-03 RX ORDER — DEXLANSOPRAZOLE 60 MG/1
1 CAPSULE CAPSULE, DELAYED RELEASE ORAL ONCE A DAY
Qty: 30 | Status: ACTIVE | COMMUNITY

## 2025-06-03 RX ORDER — OMEPRAZOLE AND SODIUM BICARBONATE 40; 1100 MG/1; MG/1
1 CAPSULE ON AN EMPTY STOMACH CAPSULE ORAL ONCE A DAY
Qty: 30 | Status: ACTIVE | COMMUNITY
Start: 2024-10-10

## 2025-06-03 RX ORDER — NIFEDIPINE 60 MG/1
1 TABLET ON AN EMPTY STOMACH TABLET, EXTENDED RELEASE ORAL ONCE A DAY
Status: ACTIVE | COMMUNITY

## 2025-06-03 RX ORDER — LINACLOTIDE 145 UG/1
1 CAPSULE AT LEAST 30 MINUTES BEFORE THE FIRST MEAL OF THE DAY ON AN EMPTY STOMACH CAPSULE, GELATIN COATED ORAL ONCE A DAY
Qty: 90 | Refills: 3 | Status: ACTIVE | COMMUNITY
Start: 2024-08-06 | End: 2025-08-01

## 2025-06-03 RX ORDER — ONDANSETRON 4 MG/1
1 TABLET ON THE TONGUE AND ALLOW TO DISSOLVE TABLET, ORALLY DISINTEGRATING ORAL
Qty: 30 TABLET | Refills: 0
Start: 2025-06-03

## 2025-06-03 RX ORDER — TOLTERODINE TARTRATE 4 MG/1
CAPSULE, EXTENDED RELEASE ORAL
Qty: 30 CAPSULE | Status: ACTIVE | COMMUNITY

## 2025-06-03 RX ORDER — CARVEDILOL 12.5 MG/1
1 TABLET WITH FOOD TABLET, FILM COATED ORAL TWICE A DAY
Status: ACTIVE | COMMUNITY

## 2025-06-03 RX ORDER — HYOSCYAMINE SULFATE 0.12 MG/1
1 TABLET UNDER THE TONGUE AND ALLOW TO DISSOLVE TABLET, ORALLY DISINTEGRATING ORAL
Qty: 40 | Refills: 2 | OUTPATIENT
Start: 2025-06-03

## 2025-06-03 RX ORDER — NEOMYCIN SULFATE 500 MG/1
1 TABLET TABLET ORAL
Qty: 28 TABLET | Refills: 0 | Status: ACTIVE | COMMUNITY
Start: 2024-08-06

## 2025-06-03 RX ORDER — HYOSCYAMINE SULFATE 0.125 MG
1 TABLET ON THE TONGUE AND ALLOW TO DISSOLVE AS NEEDED TABLET,DISINTEGRATING ORAL
Qty: 180 | Refills: 0 | Status: ACTIVE | COMMUNITY

## 2025-06-03 RX ORDER — HYOSCYAMINE SULFATE 0.12 MG/1
1 TABLET UNDER THE TONGUE AND ALLOW TO DISSOLVE TABLET, ORALLY DISINTEGRATING ORAL
Qty: 40 | Refills: 2 | Status: ACTIVE | COMMUNITY
Start: 2025-03-31

## 2025-06-03 RX ORDER — ATORVASTATIN CALCIUM 80 MG/1
1 TABLET TABLET, FILM COATED ORAL ONCE A DAY
Status: ACTIVE | COMMUNITY

## 2025-06-03 RX ORDER — TROSPIUM CHLORIDE 20 MG/1
TABLET, FILM COATED ORAL
Qty: 30 TABLET | Status: ACTIVE | COMMUNITY

## 2025-06-03 RX ORDER — HYDROCORTISONE 25 MG/G
1 APPLICATION CREAM TOPICAL TWICE A DAY
Qty: 1 | Refills: 1 | Status: ACTIVE | COMMUNITY
Start: 2025-03-31

## 2025-06-03 RX ORDER — GABAPENTIN 300 MG/1
1 CAPSULE CAPSULE ORAL
Status: ACTIVE | COMMUNITY

## 2025-06-03 RX ORDER — SOLIFENACIN SUCCINATE 10 MG/1
TABLET, FILM COATED ORAL
Qty: 90 TABLET | Status: ACTIVE | COMMUNITY

## 2025-06-03 RX ORDER — ONDANSETRON 4 MG/1
1 TABLET ON THE TONGUE AND ALLOW TO DISSOLVE TABLET, ORALLY DISINTEGRATING ORAL
Qty: 30 TABLET | Refills: 0 | Status: ACTIVE | COMMUNITY
Start: 2025-03-31

## 2025-06-03 NOTE — HPI-TODAY'S VISIT:
The patient was referred to us by Dr. Gurvinder Li for symptomatic hemorrhoids, chronic abdominal pain and heartburn. A copy of this note will be sent to the referring physician.  She has had chronic abdominal pain. She describes the pain as a burning/stabbing sensation. She has tried antispasmodics with partial response.   She was hospitalized at [facility] for chest pain. She had a CT scan which suggested CAD. She went back to see Dr. Mcwilliams who recommended further evaluation. She underwent a cardiac cath which was unremarkable.  She also was noted to have elevated BP, and "stroke like symptoms. She is now on metoprolol and Nifedipine.  She is not on antiplatelet agents or was noted to have A fib. She has not had further episodes of chest pain.  Around that time she had diarrhea and nausea.   She has been compliant with the IBSrela and feels that this is working well for the constipation but continues having lower abdominal pain.   She has heartburn. She describes retrosternal burning, especially at night. She is taking Famotidine 40mg QHS with little relief. She has tried multiple PPIs (Omeprazole, Nexium, Pantoprazole) I recommended she start Zegerid instead which has been helping with the heartburn and epigastric burning. She had received a few phone calls form the pharmacy [facility].  On 11/19/24 she had a sacral nerve stimulator placed. She had the stimulator removed on Feb 10th.  She is having a lot of infraumbilical abdominal pian. Levsin helps mainly with cramping up in the epigastric region, but not with the pain in the lower abdomen.   She feels that the Xifaxan helped quite a bit with the flatulence. Once again she is having increased flatulence.   She is still having nausea, but states it is well controlled.  She is also feeling constant fatigue.   She was diagnosed with Giardia twice while living in the [place] and wonders whether she has this infection again.   She has also noticed severe anal itching. She had seen Dr. Santos who performed hemorrhoid banding on her years ago. I prescribed Pramoxine cream but the pharmacy told her the insurance would not pay for it unless it had a prior approval. She paid for the HC suppositories out of pocket. She feels these helped partially.  I performed hemorrhoid banding. She had no issues following banding of the LL banding, but we have not pursued anymore since then. She has also been using the HC ointment which has helped with the itching.   She has HPV infection and underwent colposcopy.   No anorexia or unintentional weight loss.     The patient does not take blood thinners.       They deny any CP or WYNN.  She has a history of abdominoplasty, partial hysterectomy, lap eloina.  Previously she had been seen by Dr. Saji Adair; she has had multiple colonoscopies in the past: 2015, 2020, 2023, informed to repeat in 5-10yrs. There is no FH of colon cancer or colon polyps.  Patient was seen at [facility] for evaluation of bariatric surgery by Dr. Jose Beauchamp. She has also met with the Nutritionist. EGD and CT were done. She is following a strict diet and saw the Psychologist. It does not appear she get insurance approval for the surgery from her insurance.  Summary of prior workup: - SIBO test on 2/4/24 consistent with malabsorption with excessive hydrogen production but not so much methane production. Treated with Xifaxan.  - Labs on 10/18/2013: Negative celiac panel  - Previously seen by Dr. Saji Adair, multiple colonoscopy in the past: 2015, 2020, 2023, informed to repeat in 5-10yrs. - CT A/P on 09/25/23 grossly unremarkable - EGD on 10/2023 w Dr. Fulton revealed: Normal hypopharynx. Esophageal ulcer with no bleeding and no stigmata of recent bleeding at the GEJ. Antral and body erythema. Normal duodenum. 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 Colonoscopy by Dr. Chio Bullock and 5/16/2013 only showed hypertrophied anal papilla.  - Labs in 2013: Neg celiac panel. CRP 3.2. - EGD on 4/30/2013: Normal esophagus, stomach and duodenum.  Biopsies were negative for H. pylori. No intestinal metaplasia.

## 2025-08-05 ENCOUNTER — OFFICE VISIT (OUTPATIENT)
Dept: URBAN - METROPOLITAN AREA CLINIC 78 | Facility: CLINIC | Age: 50
End: 2025-08-05
Payer: COMMERCIAL

## 2025-08-05 DIAGNOSIS — K30 FUNCTIONAL DYSPEPSIA: ICD-10-CM

## 2025-08-05 DIAGNOSIS — K62.89 ANAL IRRITATION: ICD-10-CM

## 2025-08-05 DIAGNOSIS — K58.1 IRRITABLE BOWEL SYNDROME WITH CONSTIPATION: ICD-10-CM

## 2025-08-05 DIAGNOSIS — K63.8219 SMALL INTESTINAL BACTERIAL OVERGROWTH (SIBO): ICD-10-CM

## 2025-08-05 PROCEDURE — 99214 OFFICE O/P EST MOD 30 MIN: CPT | Performed by: INTERNAL MEDICINE

## 2025-08-05 RX ORDER — ATORVASTATIN CALCIUM 80 MG/1
1 TABLET TABLET, FILM COATED ORAL ONCE A DAY
Status: ACTIVE | COMMUNITY

## 2025-08-05 RX ORDER — VONOPRAZAN FUMARATE 26.72 MG/1
1 TABLET TABLET ORAL ONCE A DAY
Qty: 56 TABLET | Refills: 5
Start: 2024-08-06 | End: 2026-07-07

## 2025-08-05 RX ORDER — HYOSCYAMINE SULFATE 0.125 MG
1 TABLET ON THE TONGUE AND ALLOW TO DISSOLVE AS NEEDED TABLET,DISINTEGRATING ORAL
Qty: 180 | Refills: 0 | Status: ACTIVE | COMMUNITY

## 2025-08-05 RX ORDER — TOLTERODINE TARTRATE 4 MG/1
CAPSULE, EXTENDED RELEASE ORAL
Qty: 30 CAPSULE | Status: ACTIVE | COMMUNITY

## 2025-08-05 RX ORDER — GABAPENTIN 300 MG/1
1 CAPSULE CAPSULE ORAL
Status: ACTIVE | COMMUNITY

## 2025-08-05 RX ORDER — NEOMYCIN SULFATE 500 MG/1
1 TABLET TABLET ORAL
Qty: 28 TABLET | Refills: 0 | Status: ON HOLD | COMMUNITY
Start: 2024-08-06

## 2025-08-05 RX ORDER — SOLIFENACIN SUCCINATE 10 MG/1
TABLET, FILM COATED ORAL
Qty: 90 TABLET | Status: ACTIVE | COMMUNITY

## 2025-08-05 RX ORDER — TENAPANOR HYDROCHLORIDE 53.2 MG/1
1 TABLET IMMEDIATELY BEFORE MEALS TABLET ORAL TWICE A DAY
Qty: 180 TABLET | Refills: 1

## 2025-08-05 RX ORDER — HYOSCYAMINE SULFATE 0.12 MG/1
1 TABLET UNDER THE TONGUE AND ALLOW TO DISSOLVE TABLET, ORALLY DISINTEGRATING ORAL
Qty: 40 | Refills: 2 | OUTPATIENT
Start: 2025-08-05

## 2025-08-05 RX ORDER — CARVEDILOL 12.5 MG/1
1 TABLET WITH FOOD TABLET, FILM COATED ORAL TWICE A DAY
Status: ON HOLD | COMMUNITY

## 2025-08-05 RX ORDER — TROSPIUM CHLORIDE 20 MG/1
TABLET, FILM COATED ORAL
Qty: 30 TABLET | Status: ON HOLD | COMMUNITY

## 2025-08-05 RX ORDER — ONDANSETRON 4 MG/1
1 TABLET ON THE TONGUE AND ALLOW TO DISSOLVE TABLET, ORALLY DISINTEGRATING ORAL
Qty: 30 TABLET | Refills: 0
Start: 2025-08-05

## 2025-08-05 RX ORDER — NIFEDIPINE 60 MG/1
1 TABLET ON AN EMPTY STOMACH TABLET, EXTENDED RELEASE ORAL ONCE A DAY
Status: ACTIVE | COMMUNITY

## 2025-08-05 RX ORDER — OMEPRAZOLE AND SODIUM BICARBONATE 40; 1100 MG/1; MG/1
1 CAPSULE ON AN EMPTY STOMACH CAPSULE ORAL ONCE A DAY
Qty: 30 | Status: ACTIVE | COMMUNITY
Start: 2024-10-10

## 2025-08-05 RX ORDER — ONDANSETRON 4 MG/1
1 TABLET ON THE TONGUE AND ALLOW TO DISSOLVE TABLET, ORALLY DISINTEGRATING ORAL
Qty: 30 TABLET | Refills: 0 | Status: ACTIVE | COMMUNITY
Start: 2025-06-03

## 2025-08-05 RX ORDER — DEXLANSOPRAZOLE 60 MG/1
1 CAPSULE CAPSULE, DELAYED RELEASE ORAL ONCE A DAY
Qty: 30 | Status: ACTIVE | COMMUNITY

## 2025-08-05 RX ORDER — HYDROCORTISONE 25 MG/G
1 APPLICATION CREAM TOPICAL TWICE A DAY
Qty: 1 | Refills: 1 | Status: ACTIVE | COMMUNITY
Start: 2025-03-31

## 2025-08-05 RX ORDER — HYOSCYAMINE SULFATE 0.12 MG/1
1 TABLET UNDER THE TONGUE AND ALLOW TO DISSOLVE TABLET, ORALLY DISINTEGRATING ORAL
Qty: 40 | Refills: 2 | Status: ACTIVE | COMMUNITY
Start: 2025-06-03

## 2025-08-14 ENCOUNTER — OFFICE VISIT (OUTPATIENT)
Dept: URBAN - METROPOLITAN AREA CLINIC 78 | Facility: CLINIC | Age: 50
End: 2025-08-14